# Patient Record
Sex: FEMALE | Race: WHITE | NOT HISPANIC OR LATINO
[De-identification: names, ages, dates, MRNs, and addresses within clinical notes are randomized per-mention and may not be internally consistent; named-entity substitution may affect disease eponyms.]

---

## 2017-02-07 ENCOUNTER — APPOINTMENT (OUTPATIENT)
Dept: VASCULAR SURGERY | Facility: CLINIC | Age: 82
End: 2017-02-07

## 2017-02-07 VITALS
RESPIRATION RATE: 16 BRPM | SYSTOLIC BLOOD PRESSURE: 145 MMHG | DIASTOLIC BLOOD PRESSURE: 86 MMHG | OXYGEN SATURATION: 97 % | HEART RATE: 85 BPM | BODY MASS INDEX: 27.82 KG/M2 | WEIGHT: 157 LBS | TEMPERATURE: 98 F | HEIGHT: 63 IN

## 2017-02-07 RX ORDER — ATORVASTATIN CALCIUM 80 MG/1
80 TABLET, FILM COATED ORAL
Qty: 90 | Refills: 0 | Status: ACTIVE | COMMUNITY
Start: 2016-07-15

## 2017-03-20 ENCOUNTER — INPATIENT (INPATIENT)
Facility: HOSPITAL | Age: 82
LOS: 2 days | Discharge: HOME HEALTH | DRG: 66 | End: 2017-03-23
Attending: HOSPITALIST | Admitting: INTERNAL MEDICINE
Payer: MEDICARE

## 2017-03-20 VITALS
WEIGHT: 157.85 LBS | RESPIRATION RATE: 20 BRPM | OXYGEN SATURATION: 97 % | HEART RATE: 91 BPM | DIASTOLIC BLOOD PRESSURE: 86 MMHG | TEMPERATURE: 98 F | SYSTOLIC BLOOD PRESSURE: 159 MMHG | HEIGHT: 63 IN

## 2017-03-20 DIAGNOSIS — Z98.89 OTHER SPECIFIED POSTPROCEDURAL STATES: Chronic | ICD-10-CM

## 2017-03-20 DIAGNOSIS — I10 ESSENTIAL (PRIMARY) HYPERTENSION: ICD-10-CM

## 2017-03-20 DIAGNOSIS — E11.49 TYPE 2 DIABETES MELLITUS WITH OTHER DIABETIC NEUROLOGICAL COMPLICATION: ICD-10-CM

## 2017-03-20 DIAGNOSIS — I48.91 UNSPECIFIED ATRIAL FIBRILLATION: ICD-10-CM

## 2017-03-20 DIAGNOSIS — I63.9 CEREBRAL INFARCTION, UNSPECIFIED: ICD-10-CM

## 2017-03-20 DIAGNOSIS — Z98.49 CATARACT EXTRACTION STATUS, UNSPECIFIED EYE: Chronic | ICD-10-CM

## 2017-03-20 DIAGNOSIS — E78.00 PURE HYPERCHOLESTEROLEMIA, UNSPECIFIED: ICD-10-CM

## 2017-03-20 LAB
ALBUMIN SERPL ELPH-MCNC: 4 G/DL — SIGNIFICANT CHANGE UP (ref 3.3–5.2)
ALP SERPL-CCNC: 66 U/L — SIGNIFICANT CHANGE UP (ref 40–120)
ALT FLD-CCNC: 15 U/L — SIGNIFICANT CHANGE UP
ANION GAP SERPL CALC-SCNC: 12 MMOL/L — SIGNIFICANT CHANGE UP (ref 5–17)
AST SERPL-CCNC: 28 U/L — SIGNIFICANT CHANGE UP
BASOPHILS # BLD AUTO: 0 K/UL — SIGNIFICANT CHANGE UP (ref 0–0.2)
BASOPHILS NFR BLD AUTO: 0.3 % — SIGNIFICANT CHANGE UP (ref 0–2)
BILIRUB SERPL-MCNC: 0.5 MG/DL — SIGNIFICANT CHANGE UP (ref 0.4–2)
BUN SERPL-MCNC: 19 MG/DL — SIGNIFICANT CHANGE UP (ref 8–20)
CALCIUM SERPL-MCNC: 9.2 MG/DL — SIGNIFICANT CHANGE UP (ref 8.6–10.2)
CHLORIDE SERPL-SCNC: 101 MMOL/L — SIGNIFICANT CHANGE UP (ref 98–107)
CHOLEST SERPL-MCNC: 178 MG/DL — SIGNIFICANT CHANGE UP (ref 110–199)
CO2 SERPL-SCNC: 29 MMOL/L — SIGNIFICANT CHANGE UP (ref 22–29)
CREAT SERPL-MCNC: 0.73 MG/DL — SIGNIFICANT CHANGE UP (ref 0.5–1.3)
EOSINOPHIL # BLD AUTO: 0.3 K/UL — SIGNIFICANT CHANGE UP (ref 0–0.5)
EOSINOPHIL NFR BLD AUTO: 4 % — SIGNIFICANT CHANGE UP (ref 0–6)
GLUCOSE SERPL-MCNC: 95 MG/DL — SIGNIFICANT CHANGE UP (ref 70–115)
HCT VFR BLD CALC: 39.3 % — SIGNIFICANT CHANGE UP (ref 37–47)
HDLC SERPL-MCNC: 55 MG/DL — LOW
HGB BLD-MCNC: 12.7 G/DL — SIGNIFICANT CHANGE UP (ref 12–16)
LIPID PNL WITH DIRECT LDL SERPL: 96 MG/DL — SIGNIFICANT CHANGE UP
LYMPHOCYTES # BLD AUTO: 1.9 K/UL — SIGNIFICANT CHANGE UP (ref 1–4.8)
LYMPHOCYTES # BLD AUTO: 26.4 % — SIGNIFICANT CHANGE UP (ref 20–55)
MCHC RBC-ENTMCNC: 28.5 PG — SIGNIFICANT CHANGE UP (ref 27–31)
MCHC RBC-ENTMCNC: 32.3 G/DL — SIGNIFICANT CHANGE UP (ref 32–36)
MCV RBC AUTO: 88.1 FL — SIGNIFICANT CHANGE UP (ref 81–99)
MONOCYTES # BLD AUTO: 0.8 K/UL — SIGNIFICANT CHANGE UP (ref 0–0.8)
MONOCYTES NFR BLD AUTO: 11.4 % — HIGH (ref 3–10)
NEUTROPHILS # BLD AUTO: 4.1 K/UL — SIGNIFICANT CHANGE UP (ref 1.8–8)
NEUTROPHILS NFR BLD AUTO: 57.6 % — SIGNIFICANT CHANGE UP (ref 37–73)
PLATELET # BLD AUTO: 179 K/UL — SIGNIFICANT CHANGE UP (ref 150–400)
POTASSIUM SERPL-MCNC: 4.6 MMOL/L — SIGNIFICANT CHANGE UP (ref 3.5–5.3)
POTASSIUM SERPL-SCNC: 4.6 MMOL/L — SIGNIFICANT CHANGE UP (ref 3.5–5.3)
PROT SERPL-MCNC: 7.3 G/DL — SIGNIFICANT CHANGE UP (ref 6.6–8.7)
RBC # BLD: 4.46 M/UL — SIGNIFICANT CHANGE UP (ref 4.4–5.2)
RBC # FLD: 13.6 % — SIGNIFICANT CHANGE UP (ref 11–15.6)
SODIUM SERPL-SCNC: 142 MMOL/L — SIGNIFICANT CHANGE UP (ref 135–145)
TOTAL CHOLESTEROL/HDL RATIO MEASUREMENT: 3 RATIO — LOW (ref 3.3–7.1)
TRIGL SERPL-MCNC: 136 MG/DL — SIGNIFICANT CHANGE UP (ref 10–200)
TROPONIN T SERPL-MCNC: <0.01 NG/ML — SIGNIFICANT CHANGE UP (ref 0–0.06)
WBC # BLD: 7.1 K/UL — SIGNIFICANT CHANGE UP (ref 4.8–10.8)
WBC # FLD AUTO: 7.1 K/UL — SIGNIFICANT CHANGE UP (ref 4.8–10.8)

## 2017-03-20 PROCEDURE — 93880 EXTRACRANIAL BILAT STUDY: CPT | Mod: 26

## 2017-03-20 PROCEDURE — 99223 1ST HOSP IP/OBS HIGH 75: CPT

## 2017-03-20 PROCEDURE — 93010 ELECTROCARDIOGRAM REPORT: CPT

## 2017-03-20 PROCEDURE — 70450 CT HEAD/BRAIN W/O DYE: CPT | Mod: 26

## 2017-03-20 PROCEDURE — 99291 CRITICAL CARE FIRST HOUR: CPT

## 2017-03-20 PROCEDURE — 71010: CPT | Mod: 26

## 2017-03-20 PROCEDURE — 70544 MR ANGIOGRAPHY HEAD W/O DYE: CPT | Mod: 26

## 2017-03-20 RX ORDER — ATORVASTATIN CALCIUM 80 MG/1
40 TABLET, FILM COATED ORAL AT BEDTIME
Qty: 0 | Refills: 0 | Status: DISCONTINUED | OUTPATIENT
Start: 2017-03-20 | End: 2017-03-23

## 2017-03-20 RX ORDER — DEXTROSE 50 % IN WATER 50 %
1 SYRINGE (ML) INTRAVENOUS ONCE
Qty: 0 | Refills: 0 | Status: DISCONTINUED | OUTPATIENT
Start: 2017-03-20 | End: 2017-03-23

## 2017-03-20 RX ORDER — DEXTROSE 50 % IN WATER 50 %
25 SYRINGE (ML) INTRAVENOUS ONCE
Qty: 0 | Refills: 0 | Status: DISCONTINUED | OUTPATIENT
Start: 2017-03-20 | End: 2017-03-23

## 2017-03-20 RX ORDER — ATORVASTATIN CALCIUM 80 MG/1
20 TABLET, FILM COATED ORAL AT BEDTIME
Qty: 0 | Refills: 0 | Status: DISCONTINUED | OUTPATIENT
Start: 2017-03-20 | End: 2017-03-20

## 2017-03-20 RX ORDER — OXYBUTYNIN CHLORIDE 5 MG
5 TABLET ORAL DAILY
Qty: 0 | Refills: 0 | Status: DISCONTINUED | OUTPATIENT
Start: 2017-03-20 | End: 2017-03-23

## 2017-03-20 RX ORDER — ASPIRIN/CALCIUM CARB/MAGNESIUM 324 MG
325 TABLET ORAL DAILY
Qty: 0 | Refills: 0 | Status: DISCONTINUED | OUTPATIENT
Start: 2017-03-20 | End: 2017-03-22

## 2017-03-20 RX ORDER — ASPIRIN/CALCIUM CARB/MAGNESIUM 324 MG
325 TABLET ORAL ONCE
Qty: 0 | Refills: 0 | Status: COMPLETED | OUTPATIENT
Start: 2017-03-20 | End: 2017-03-20

## 2017-03-20 RX ORDER — MULTIVIT-MIN/FERROUS GLUCONATE 9 MG/15 ML
0 LIQUID (ML) ORAL
Qty: 0 | Refills: 0 | COMMUNITY

## 2017-03-20 RX ORDER — ENOXAPARIN SODIUM 100 MG/ML
40 INJECTION SUBCUTANEOUS EVERY 24 HOURS
Qty: 0 | Refills: 0 | Status: DISCONTINUED | OUTPATIENT
Start: 2017-03-20 | End: 2017-03-22

## 2017-03-20 RX ORDER — INSULIN LISPRO 100/ML
VIAL (ML) SUBCUTANEOUS
Qty: 0 | Refills: 0 | Status: DISCONTINUED | OUTPATIENT
Start: 2017-03-20 | End: 2017-03-23

## 2017-03-20 RX ORDER — DEXTROSE 50 % IN WATER 50 %
12.5 SYRINGE (ML) INTRAVENOUS ONCE
Qty: 0 | Refills: 0 | Status: DISCONTINUED | OUTPATIENT
Start: 2017-03-20 | End: 2017-03-23

## 2017-03-20 RX ORDER — GLUCAGON INJECTION, SOLUTION 0.5 MG/.1ML
1 INJECTION, SOLUTION SUBCUTANEOUS ONCE
Qty: 0 | Refills: 0 | Status: DISCONTINUED | OUTPATIENT
Start: 2017-03-20 | End: 2017-03-23

## 2017-03-20 RX ORDER — SODIUM CHLORIDE 9 MG/ML
1000 INJECTION, SOLUTION INTRAVENOUS
Qty: 0 | Refills: 0 | Status: DISCONTINUED | OUTPATIENT
Start: 2017-03-20 | End: 2017-03-23

## 2017-03-20 RX ADMIN — Medication 325 MILLIGRAM(S): at 12:01

## 2017-03-20 NOTE — ED PROVIDER NOTE - PMH
Abdominal aortic aneurysm    Atrial fibrillation    Diabetes    High cholesterol    Hypertension    Overactive bladder

## 2017-03-20 NOTE — ED PROVIDER NOTE - CRITICAL CARE PROVIDED
additional history taking/direct patient care (not related to procedure)/documentation/interpretation of diagnostic studies/consultation with other physicians

## 2017-03-20 NOTE — H&P ADULT - NSHPLABSRESULTS_GEN_ALL_CORE
CT brain: IMPRESSION: No acute segmental infarct or hemorrhage. Extensive small   vessel ischemic changes which may mask acute ischemia,  MRI and/or   follow-up CT is a more sensitive modality and may be considered if   clinically indicated. Findings were discussed with Physician: FRANC CARVALHO [phone] with a read back at 11:40 AM.     CXR ordered    EKG visualized Sinus bradycardia, nonspecific ST abnormalities

## 2017-03-20 NOTE — H&P ADULT - HISTORY OF PRESENT ILLNESS
83 y F hx PAF, DM2, HTN, HLD, presented to ER c/o awoke this AM with slurred speech,  L hand numbness. Denies CP,SOB, palpitations, F/C, h/a, N/V/D, dysuria, focal weakness. Mild blurry vision. Denies any prior episodes. ER discussed w stroke neuro, deemed not a candidate for TPA.

## 2017-03-20 NOTE — ED PROVIDER NOTE - NEUROLOGICAL, MLM
Alert and oriented, no focal deficits, no motor or sensory deficits, minimal slurred speech, normal gait, no facial droop, no drift

## 2017-03-20 NOTE — H&P ADULT - ASSESSMENT
83 y F hx HTN, HLD, DM2, presented to ER c/o slurred speech, L hand numbness since this AM, r/o CVA.     CVA: CT w extensive small vessel ischemic changes, no acute infarct or hemorrhage seen, f/u MRI/MRA, CD, echo. Cardio eval for hx of afib, currently in NSR. appreciate neuro input. cont asa, statin.   HTN: hold meds for now, resume as tolerated  DM: monitor FS, sliding scale    Prophyl: lovenox

## 2017-03-20 NOTE — ED ADULT NURSE NOTE - CAS EDN DISCHARGE ASSESSMENT
Alert and oriented to person, place and time/Awake Alert and oriented to person, place and time/Awake/Patient baseline mental status

## 2017-03-20 NOTE — ED ADULT TRIAGE NOTE - CHIEF COMPLAINT QUOTE
Patient arrived to ED today with c/o slurred speech, left sided weakness, right sided facial droop since 7am today.  Patient states she feels like she is floating.

## 2017-03-20 NOTE — CONSULT NOTE ADULT - ASSESSMENT
1. signs and sx's favor acute CVA-possibly embolic given hx of rare Parox A-fib. Would consider systemic AC with heparin and followed by a NOAC if OK'd by neuro. Will need an MRI of head.  2. hx of hpt-normotensive presently  3. aodm. 1. signs and sx's favor acute CVA-possibly embolic given hx of rare Parox A-fib. Would consider systemic AC with heparin and followed by a NOAC if OK'd by neuro. Will need an MRI of head. Consider DEYANIRA to exclude CSE.  2. hx of hpt-normotensive presently  3. aodm.

## 2017-03-20 NOTE — CONSULT NOTE ADULT - SUBJECTIVE AND OBJECTIVE BOX
CHIEF COMPLAINT: slurred sppech    HPI: 82yFemale with h/o DM, HLD, HTN  presents with slurred speech upon awakening this am. Went to bed asymptomatic. No previous h/o CVA/TIA.  Otherwise feels well. Perhaps mild blurred vision and dizziness.  She has h/o afib about one year ago followed by Dr. Carroll.   not a tpa candidate. On asa 81mg.      PAST MEDICAL & SURGICAL HISTORY:  Abdominal aortic aneurysm  Atrial fibrillation  Overactive bladder  High cholesterol  Diabetes  Hypertension  H/O foot surgery  H/O cataract extraction    MEDICATIONS  (STANDING): list pending    MEDICATIONS  (PRN):     Allergies     No Known Allergies    Intolerances        FAMILY HISTORY:  No pertinent family history in first degree relatives          SOCIAL HISTORY:    Tobacco:  no  Alcohol:  no  Drugs:  no        REVIEW OF SYSTEMS:    Relevant systems are negative except as noted in the chart, HPI, and PMH      VITAL SIGNS:  Vital Signs Last 24 Hrs  T(C): 36.4, Max: 36.4 (03-20 @ 10:57)  T(F): 97.6, Max: 97.6 (03-20 @ 10:57)  HR: 82 (82 - 91)  BP: 175/82 (159/86 - 175/82)  BP(mean): --  RR: 18 (18 - 20)  SpO2: 97% (97% - 97%)    PHYSICAL EXAMINATION:    General: Well-developed, well nourished, in no acute distress.  Cardiac:  Regular rate and rhythm. No carotid bruits appreciated.  Eyes: Fundoscopic examination was deferred.  Neurologic:  - Mental Status:  Alert, awake, oriented to person, place, and time; Speech is fluent with intact naming, repetition, and comprehension  Cranial Nerves II-XII:    II:  Visual acuity is normal for age ; Visual fields are full to confrontation; Pupils are equal, round, and reactive to light.  III, IV, VI:  Extraocular movements are intact without nystagmus.  V:  Facial sensation is intact in the V1-V3 distribution bilaterally.  VII:  Face - slight flatteing of left NLF with normal eye closure and smile  VIII:  Hearing is intact and symmetric for age  IX, X:  Uvula is midline and soft palate rises symmetrically  XI:  Head turning and shoulder shrug are intact.  XII:  Tongue protrudes in the midline. mild dysarthira/slurr  - Motor:  Strength is 5/5 throughout.  There is no pronator drift.  Normal muscle bulk and tone throughout.  - Reflexes:  1+ and symmetric at the biceps, and  knees.  Plantar responses flexor.  - Sensory:  Intact and symmetric to light touch, and joint-position sense.  - Coordination:  Finger-nose-finger is normal. Rapid alternating hand movements are intact.       LABS:  pend    RADIOLOGY & ADDITIONAL STUDIES:     CT: No acute segmental infarct or hemorrhage. Extensive small   vessel ischemic changes which may mask acute ischemia,      IMPRESSION:  82 year old woman presents with onset of mild dysarthria and subtle left facial droop this am.  She has multiple vascualr risk factors and her CT shows extensive MVD despite no clinical h/o stroke or TIA  She has h/o afib in past. Currently only on ASA    PLAN:  1. Admit to Stroke unite- Dr. Mcdaniel- stroke protocols  2. Medical and cardiac assessment- management of vascular risk factors  3. Continue antiplatelet therapy for now unless cardioembolic potential/source is identified  4.  MRI/ MRA head and neck  5. Carotid duplex

## 2017-03-20 NOTE — ED PROVIDER NOTE - MEDICAL DECISION MAKING DETAILS
The patient woke up with slurred speech and off balance feeling at 7 AM. Last normal at 10 PM last night. NIH 1, The case d/w Wyatt Telestroke MD and recommend NO TPA at this time and also, Dr Peñaloza our Neurology MD seen patient and recommend to admit to Stroke Unit

## 2017-03-20 NOTE — ED PROVIDER NOTE - OBJECTIVE STATEMENT
The patient is a 82 year old female with history of DM, HTN, HyperCholesterol presents with feeling off balance and slurred speech upon waking up at 7 am.  The patient went to bed at 10 PM last night feeling normal.  But the patient woke up with these symptoms.  No HA, No CP, No SOB, No ABD Pain

## 2017-03-20 NOTE — H&P ADULT - NSHPPHYSICALEXAM_GEN_ALL_CORE
Vital Signs Last 24 Hrs  T(C): 36.4, Max: 36.4 (03-20 @ 10:57)  T(F): 97.6, Max: 97.6 (03-20 @ 10:57)  HR: 82 (82 - 91)  BP: 175/82 (159/86 - 175/82)  BP(mean): --  RR: 18 (18 - 20)  SpO2: 97% (97% - 97%)    PHYSICAL EXAM-  GENERAL: alert, NAD  HEAD:  Atraumatic, Normocephalic  EYES: EOMI,  conjunctiva and sclera clear  NECK: Supple   CHEST/LUNG: CTA bilaterally   HEART: Regular rate and rhythm; No murmurs   ABDOMEN: Soft, Nontender, Nondistended; Bowel sounds present  EXTREMITIES:  2+ Peripheral Pulses, No clubbing, cyanosis, or edema  NERVOUS SYSTEM:  Alert & Oriented X3, Motor Strength 5/5 B/L upper and lower extremities; mild slurred speech, sensation intact  SKIN: No rashes or lesions

## 2017-03-20 NOTE — CONSULT NOTE ADULT - SUBJECTIVE AND OBJECTIVE BOX
Chief Complaint: slurred speech and blurry vision/left hand weakness    HPI: Old records reviewed. 83 yo F brought to ER after awakening with new slurred speech (pt aware) with blurry vision and left had weakness. [place] well last night. Seen by neuro-no tpa. PMH + for aodm/hypertension and rare parox a-fib. No prior neuro hx/mi/cp/murmur/chf/renal/hepatic/heme hx. Hx of goiter but no thyroid rx. No significant surgical hx. Non smoker/etoh/no allergy. ROS otherwise neg. OP meds of asa/lipitor/metformin/lopressor and oxybutinin.    PAST MEDICAL & SURGICAL HISTORY:  Abdominal aortic aneurysm  Atrial fibrillation  Overactive bladder  High cholesterol  Diabetes  Hypertension  H/O foot surgery  H/O cataract extraction      PREVIOUS DIAGNOSTIC TESTING:      ECHO  FINDINGS: echo today-LVEF 50+%/mild AI/mild to moderate MR/mildly  dilated dilated LA.    STRESS  FINDINGS: coronary pet: calcified coronaries/EF 50% no ischemia.      CATHETERIZATION  FINDINGS:    MEDICATIONS  (STANDING):  aspirin 325milliGRAM(s) Oral daily  enoxaparin Injectable 40milliGRAM(s) SubCutaneous every 24 hours  atorvastatin 40milliGRAM(s) Oral at bedtime  oxybutynin 5milliGRAM(s) Oral daily  insulin lispro (HumaLOG) corrective regimen sliding scale  SubCutaneous three times a day before meals  dextrose 5%. 1000milliLiter(s) IV Continuous <Continuous>  dextrose 50% Injectable 12.5Gram(s) IV Push once  dextrose 50% Injectable 25Gram(s) IV Push once  dextrose 50% Injectable 25Gram(s) IV Push once    MEDICATIONS  (PRN):  dextrose Gel 1Dose(s) Oral once PRN Blood Glucose LESS THAN 70 milliGRAM(s)/deciliter  glucagon  Injectable 1milliGRAM(s) IntraMuscular once PRN Glucose LESS THAN 70 milligrams/deciliter      FAMILY HISTORY:  No pertinent family history in first degree relatives      SOCIAL HISTORY:    CIGARETTES: 0    ALCOHOL: 0    ROS: Negative other than as mentioned in HPI.    Vital Signs Last 24 Hrs  T(C): 36.4, Max: 36.4 (03-20 @ 10:57)  T(F): 97.6, Max: 97.6 (03-20 @ 10:57)  HR: 61 (61 - 91)  BP: 151/65 (151/65 - 175/82)  BP(mean): --  RR: 20 (18 - 24)  SpO2: 98% (97% - 98%)    PHYSICAL EXAM:  General: Appears well developed, well nourished alert and cooperative. speech is slurred  HEENT: Head; normocephalic, atraumatic.  Eyes;   Pupils reactive, cornea wnl.  Neck; Supple, no nodes adenopathy, no NVD or carotid bruit or thyromegaly.  CARDIOVASCULAR; Soft mild diastolic murmur of AI. murmur, rub, gallop or lift. Normal S1 and S2.   LUNGS; No rales, rhonchi or wheeze. Normal breath sounds bilaterally.  ABDOMEN ; Soft, nontender without mass or organomegaly. bowel sounds normoactive.  EXTREMITIES; No clubbing, cyanosis or edema. Distal pulses wnl. ROM normal.   SKIN; warm and dry with normal turgor.  NEURO; Alert/oriented x 3/normal motor exam. No pathologic reflexes. Mildly slurred speech and very mild left sided mouth droop  PSYCH; normal affect.            INTERPRETATION OF TELEMETRY:    ECG: SR with apc's otherwise nl.  cxr normal lungs and cor with a right parasternal soft  tissue density.  I&O's Detail  CT of head -diffuse MVD w/o clear cut cva.  sono of carotids pending.  LABS:                        12.7   7.1   )-----------( 179      ( 20 Mar 2017 11:27 )             39.3     20 Mar 2017 11:27    142    |  101    |  19.0   ----------------------------<  95     4.6     |  29.0   |  0.73     Ca    9.2        20 Mar 2017 11:27    TPro  7.3    /  Alb  4.0    /  TBili  0.5    /  DBili  x      /  AST  28     /  ALT  15     /  AlkPhos  66     20 Mar 2017 11:27    CARDIAC MARKERS ( 20 Mar 2017 11:27 )  x     / <0.01 ng/mL / x     / x     / x              I&O's Summary      RADIOLOGY & ADDITIONAL STUDIES:

## 2017-03-20 NOTE — ED PROVIDER NOTE - CHPI ED SYMPTOMS NEG
no weakness/no fever/no change in level of consciousness/no dizziness/no numbness/no confusion/no nausea/no blurred vision/no vomiting/no loss of consciousness

## 2017-03-20 NOTE — ED PROVIDER NOTE - CARE PLAN
Principal Discharge DX:	Stroke  Secondary Diagnosis:	Atrial fibrillation  Secondary Diagnosis:	Diabetes

## 2017-03-20 NOTE — ED ADULT NURSE NOTE - OBJECTIVE STATEMENT
82 year old female comes to the ER. code STROKE activated. as per patient she went to sleep at 10pm and woke up this am at 7am. pt. states she felt off balanced , slight slurred speech. presenting to the ED. MD. Leach at bedside. no deficits noted. bs 89- pt. denies chest pain, or sob. vs as charted, iv placed, labs sent. will continue to monitor.

## 2017-03-21 PROCEDURE — 71250 CT THORAX DX C-: CPT | Mod: 26

## 2017-03-21 PROCEDURE — 99233 SBSQ HOSP IP/OBS HIGH 50: CPT

## 2017-03-21 PROCEDURE — 93010 ELECTROCARDIOGRAM REPORT: CPT

## 2017-03-21 RX ORDER — METOPROLOL TARTRATE 50 MG
5 TABLET ORAL ONCE
Qty: 0 | Refills: 0 | Status: COMPLETED | OUTPATIENT
Start: 2017-03-21 | End: 2017-03-21

## 2017-03-21 RX ORDER — ACETAMINOPHEN 500 MG
650 TABLET ORAL EVERY 6 HOURS
Qty: 0 | Refills: 0 | Status: DISCONTINUED | OUTPATIENT
Start: 2017-03-21 | End: 2017-03-23

## 2017-03-21 RX ORDER — METOPROLOL TARTRATE 50 MG
50 TABLET ORAL
Qty: 0 | Refills: 0 | Status: DISCONTINUED | OUTPATIENT
Start: 2017-03-21 | End: 2017-03-22

## 2017-03-21 RX ADMIN — ATORVASTATIN CALCIUM 40 MILLIGRAM(S): 80 TABLET, FILM COATED ORAL at 00:14

## 2017-03-21 RX ADMIN — Medication 5 MILLIGRAM(S): at 20:15

## 2017-03-21 RX ADMIN — ENOXAPARIN SODIUM 40 MILLIGRAM(S): 100 INJECTION SUBCUTANEOUS at 00:16

## 2017-03-21 RX ADMIN — Medication 50 MILLIGRAM(S): at 21:04

## 2017-03-21 RX ADMIN — Medication: at 17:37

## 2017-03-21 RX ADMIN — Medication 5 MILLIGRAM(S): at 11:41

## 2017-03-21 RX ADMIN — Medication 325 MILLIGRAM(S): at 11:41

## 2017-03-21 NOTE — PROGRESS NOTE ADULT - ASSESSMENT
83 y F hx HTN, HLD, DM2, h/o PAF presented to ER c/o slurred speech, L hand numbness admitted due to CVA, CT head showed extensive small vessel ischemic changes    A/P    > CVA: Appreciate Neurology input   CT w extensive small vessel ischemic changes, no acute infarct or hemorrhage seen,   MRI/MRA and echo.pending  carotid doppler - negative , LDL 96  appreciate cardiology input, schedule for DEYANIRA in am , defer Ac as per cardiology   cont asa, statin.     > HTN: Permissive HTN due to possible stroke  labetalol prn of SBP is >180    > DM: Stable  monitor FS, sliding scale  A1c     > Prophyl: lovenox

## 2017-03-21 NOTE — PROGRESS NOTE ADULT - SUBJECTIVE AND OBJECTIVE BOX
Internal Medicine Hospitalist - Dr. William MOSS    [id number]    82y      Female    Patient is a 82y old  Female who presents with a chief complaint of slurred speech (20 Mar 2017 12:10)    HPI:  83 y F hx PAF, DM2, HTN, HLD, presented to ER c/o awoke this AM with slurred speech,  L hand numbness. Denies CP,SOB, palpitations, F/C, h/a, N/V/D, dysuria, focal weakness. Mild blurry vision. Denies any prior episodes. ER discussed w stroke neuro, deemed not a candidate for TPA. (20 Mar 2017 12:10)      INTERVAL HPI/ OVERNIGHT EVENTS: Patient is seen and examined, pt cont. to report very mild slurred speech, denied weakness, numbness, chest pain, SOB    REVIEW OF SYSTEMS:    Denied fever, chills, abd. pain, nausea, vomiting, chest pain, SOB, headache, dizziness    PHYSICAL EXAM:    Vital Signs Last 24 Hrs  T(C): 36.7, Max: 36.7 (03-21 @ 10:44)  T(F): 98.1, Max: 98.1 (03-21 @ 10:44)  HR: 81 (62 - 81)  BP: 162/72 (147/74 - 181/74)  BP(mean): --  RR: 18 (16 - 22)  SpO2: 95% (94% - 98%)    GENERAL: NAD  HEENT: EOMI  Neck: supple  CHEST/LUNG: Clear to percussion bilaterally; No wheezing  HEART: S1S2+ audible  ABDOMEN: Soft, Nontender, Nondistended; Bowel sounds present  EXTREMITIES:  no edema  CNS: AAO X 3, mild dysarthria, power equal in all 4 extremities, normal sensation    Psychiatry: normal mood    LABS:                        12.7   7.1   )-----------( 179      ( 20 Mar 2017 11:27 )             39.3     20 Mar 2017 11:27    142    |  101    |  19.0   ----------------------------<  95     4.6     |  29.0   |  0.73     Ca    9.2        20 Mar 2017 11:27    TPro  7.3    /  Alb  4.0    /  TBili  0.5    /  DBili  x      /  AST  28     /  ALT  15     /  AlkPhos  66     20 Mar 2017 11:27      MEDICATIONS  (STANDING):  aspirin 325milliGRAM(s) Oral daily  enoxaparin Injectable 40milliGRAM(s) SubCutaneous every 24 hours  atorvastatin 40milliGRAM(s) Oral at bedtime  oxybutynin 5milliGRAM(s) Oral daily  insulin lispro (HumaLOG) corrective regimen sliding scale  SubCutaneous three times a day before meals  dextrose 5%. 1000milliLiter(s) IV Continuous <Continuous>  dextrose 50% Injectable 12.5Gram(s) IV Push once  dextrose 50% Injectable 25Gram(s) IV Push once  dextrose 50% Injectable 25Gram(s) IV Push once    MEDICATIONS  (PRN):  dextrose Gel 1Dose(s) Oral once PRN Blood Glucose LESS THAN 70 milliGRAM(s)/deciliter  glucagon  Injectable 1milliGRAM(s) IntraMuscular once PRN Glucose LESS THAN 70 milligrams/deciliter  acetaminophen   Tablet. 650milliGRAM(s) Oral every 6 hours PRN Moderate Pain (4 - 6)      RADIOLOGY & ADDITIONAL TEST   EXAM:  US DPLX CAROTIDS COMPL BI                        PROCEDURE DATE:  03/20/2017    Impression:   No hemodynamically significant stenosis in the carotid or vertebral   arteries according to NASCET criteria.

## 2017-03-21 NOTE — PROGRESS NOTE ADULT - ASSESSMENT
1. elderly F with very recent CVA by findings. MRI pending.  2. with hx of paroxysmal afib will schedule for DEYANIRA to r/o VALERIE clot and assess need for AC.-proceedure explained in detail to pt and family.  3. chronic AI.  4. hx of dm.

## 2017-03-21 NOTE — PROGRESS NOTE ADULT - SUBJECTIVE AND OBJECTIVE BOX
Chief Complaint: recent course reviewed.    HPI: Remains with slurred speech (?improved) and mild weakness of left hand. Pt unhappy about being in ER this long.    PAST MEDICAL & SURGICAL HISTORY:  Abdominal aortic aneurysm  Atrial fibrillation  Overactive bladder  High cholesterol  Diabetes  Hypertension  H/O foot surgery  H/O cataract extraction      PREVIOUS DIAGNOSTIC TESTING:      ECHO  FINDINGS: Moderate AI and LVEF 50+%.    STRESS  FINDINGS:    CATHETERIZATION  FINDINGS:    MEDICATIONS  (STANDING):  aspirin 325milliGRAM(s) Oral daily  enoxaparin Injectable 40milliGRAM(s) SubCutaneous every 24 hours  atorvastatin 40milliGRAM(s) Oral at bedtime  oxybutynin 5milliGRAM(s) Oral daily  insulin lispro (HumaLOG) corrective regimen sliding scale  SubCutaneous three times a day before meals  dextrose 5%. 1000milliLiter(s) IV Continuous <Continuous>  dextrose 50% Injectable 12.5Gram(s) IV Push once  dextrose 50% Injectable 25Gram(s) IV Push once  dextrose 50% Injectable 25Gram(s) IV Push once    MEDICATIONS  (PRN):  dextrose Gel 1Dose(s) Oral once PRN Blood Glucose LESS THAN 70 milliGRAM(s)/deciliter  glucagon  Injectable 1milliGRAM(s) IntraMuscular once PRN Glucose LESS THAN 70 milligrams/deciliter      FAMILY HISTORY:  No pertinent family history in first degree relatives      ROS: Negative other than as mentioned in HPI.    Vital Signs Last 24 Hrs  T(C): 36.5, Max: 36.6 (03-20 @ 18:47)  T(F): 97.7, Max: 97.8 (03-20 @ 18:47)  HR: 80 (61 - 91)  BP: 120/80 RA manually (147/74 - 181/74)  BP(mean): --  RR: 12 non-labored. (16 - 24)  SpO2: 98% (94% - 98%)    PHYSICAL EXAM:  General: Appears well developed, well nourished alert and cooperative.  HEENT: Head; normocephalic, atraumatic.  Eyes;   Pupils reactive, cornea wnl.  Neck; Supple, no nodes adenopathy, no NVD or carotid bruit or thyromegaly.  CARDIOVASCULAR; No murmur, rub, gallop or lift. Normal S1 and S2.  LUNGS; No rales, rhonchi or wheeze. Normal breath sounds bilaterally.  ABDOMEN ; Soft, nontender without mass or organomegaly. bowel sounds normoactive.  EXTREMITIES; No clubbing, cyanosis or edema. Distal pulses wnl. ROM normal.  SKIN; warm and dry with normal turgor.  NEURO; Alert/oriented x 3/normal motor exam. No pathologic reflexes. Slurred speech with left mouth droop.  PSYCH; normal affect.            INTERPRETATION OF TELEMETRY:    ECG: monitor of sr with [facility]'s  MRI pending  I&O's Detail      LABS:                        12.7   7.1   )-----------( 179      ( 20 Mar 2017 11:27 )             39.3     20 Mar 2017 11:27    142    |  101    |  19.0   ----------------------------<  95     4.6     |  29.0   |  0.73     Ca    9.2        20 Mar 2017 11:27    TPro  7.3    /  Alb  4.0    /  TBili  0.5    /  DBili  x      /  AST  28     /  ALT  15     /  AlkPhos  66     20 Mar 2017 11:27    CARDIAC MARKERS ( 20 Mar 2017 11:27 )  x     / <0.01 ng/mL / x     / x     / x              I&O's Summary      RADIOLOGY & ADDITIONAL STUDIES:

## 2017-03-21 NOTE — ED ADULT NURSE REASSESSMENT NOTE - GENERAL PATIENT STATE
comfortable appearance/family/SO at bedside
comfortable appearance/family/SO at bedside
family/SO at bedside/comfortable appearance
resting/sleeping/comfortable appearance
family/SO at bedside/comfortable appearance

## 2017-03-22 ENCOUNTER — TRANSCRIPTION ENCOUNTER (OUTPATIENT)
Age: 82
End: 2017-03-22

## 2017-03-22 LAB
ANION GAP SERPL CALC-SCNC: 12 MMOL/L — SIGNIFICANT CHANGE UP (ref 5–17)
BUN SERPL-MCNC: 19 MG/DL — SIGNIFICANT CHANGE UP (ref 8–20)
CALCIUM SERPL-MCNC: 8.7 MG/DL — SIGNIFICANT CHANGE UP (ref 8.6–10.2)
CHLORIDE SERPL-SCNC: 103 MMOL/L — SIGNIFICANT CHANGE UP (ref 98–107)
CO2 SERPL-SCNC: 26 MMOL/L — SIGNIFICANT CHANGE UP (ref 22–29)
CREAT SERPL-MCNC: 0.62 MG/DL — SIGNIFICANT CHANGE UP (ref 0.5–1.3)
GLUCOSE SERPL-MCNC: 104 MG/DL — SIGNIFICANT CHANGE UP (ref 70–115)
HBA1C BLD-MCNC: 5.8 % — HIGH (ref 4–5.6)
HCT VFR BLD CALC: 38.2 % — SIGNIFICANT CHANGE UP (ref 37–47)
HGB BLD-MCNC: 12.5 G/DL — SIGNIFICANT CHANGE UP (ref 12–16)
MAGNESIUM SERPL-MCNC: 2.1 MG/DL — SIGNIFICANT CHANGE UP (ref 1.8–2.5)
MCHC RBC-ENTMCNC: 28 PG — SIGNIFICANT CHANGE UP (ref 27–31)
MCHC RBC-ENTMCNC: 32.7 G/DL — SIGNIFICANT CHANGE UP (ref 32–36)
MCV RBC AUTO: 85.7 FL — SIGNIFICANT CHANGE UP (ref 81–99)
PHOSPHATE SERPL-MCNC: 3.2 MG/DL — SIGNIFICANT CHANGE UP (ref 2.4–4.7)
PLATELET # BLD AUTO: 173 K/UL — SIGNIFICANT CHANGE UP (ref 150–400)
POTASSIUM SERPL-MCNC: 4.2 MMOL/L — SIGNIFICANT CHANGE UP (ref 3.5–5.3)
POTASSIUM SERPL-SCNC: 4.2 MMOL/L — SIGNIFICANT CHANGE UP (ref 3.5–5.3)
RBC # BLD: 4.46 M/UL — SIGNIFICANT CHANGE UP (ref 4.4–5.2)
RBC # FLD: 13.3 % — SIGNIFICANT CHANGE UP (ref 11–15.6)
SODIUM SERPL-SCNC: 141 MMOL/L — SIGNIFICANT CHANGE UP (ref 135–145)
WBC # BLD: 7.1 K/UL — SIGNIFICANT CHANGE UP (ref 4.8–10.8)
WBC # FLD AUTO: 7.1 K/UL — SIGNIFICANT CHANGE UP (ref 4.8–10.8)

## 2017-03-22 PROCEDURE — 70551 MRI BRAIN STEM W/O DYE: CPT | Mod: 26

## 2017-03-22 PROCEDURE — 99233 SBSQ HOSP IP/OBS HIGH 50: CPT

## 2017-03-22 PROCEDURE — 70547 MR ANGIOGRAPHY NECK W/O DYE: CPT | Mod: 26

## 2017-03-22 RX ORDER — PANTOPRAZOLE SODIUM 20 MG/1
40 TABLET, DELAYED RELEASE ORAL
Qty: 0 | Refills: 0 | Status: DISCONTINUED | OUTPATIENT
Start: 2017-03-22 | End: 2017-03-23

## 2017-03-22 RX ORDER — APIXABAN 2.5 MG/1
5 TABLET, FILM COATED ORAL
Qty: 0 | Refills: 0 | Status: DISCONTINUED | OUTPATIENT
Start: 2017-03-22 | End: 2017-03-23

## 2017-03-22 RX ORDER — INFLUENZA VIRUS VACCINE 15; 15; 15; 15 UG/.5ML; UG/.5ML; UG/.5ML; UG/.5ML
0.5 SUSPENSION INTRAMUSCULAR ONCE
Qty: 0 | Refills: 0 | Status: COMPLETED | OUTPATIENT
Start: 2017-03-22 | End: 2017-03-23

## 2017-03-22 RX ORDER — APIXABAN 2.5 MG/1
1 TABLET, FILM COATED ORAL
Qty: 60 | Refills: 0 | OUTPATIENT
Start: 2017-03-22 | End: 2017-04-21

## 2017-03-22 RX ORDER — METOPROLOL TARTRATE 50 MG
50 TABLET ORAL THREE TIMES A DAY
Qty: 0 | Refills: 0 | Status: DISCONTINUED | OUTPATIENT
Start: 2017-03-22 | End: 2017-03-23

## 2017-03-22 RX ADMIN — ATORVASTATIN CALCIUM 40 MILLIGRAM(S): 80 TABLET, FILM COATED ORAL at 00:13

## 2017-03-22 RX ADMIN — APIXABAN 5 MILLIGRAM(S): 2.5 TABLET, FILM COATED ORAL at 19:05

## 2017-03-22 RX ADMIN — Medication 50 MILLIGRAM(S): at 05:43

## 2017-03-22 RX ADMIN — ENOXAPARIN SODIUM 40 MILLIGRAM(S): 100 INJECTION SUBCUTANEOUS at 00:14

## 2017-03-22 RX ADMIN — Medication 5 MILLIGRAM(S): at 13:57

## 2017-03-22 RX ADMIN — ATORVASTATIN CALCIUM 40 MILLIGRAM(S): 80 TABLET, FILM COATED ORAL at 21:36

## 2017-03-22 RX ADMIN — Medication 50 MILLIGRAM(S): at 15:57

## 2017-03-22 NOTE — DISCHARGE NOTE ADULT - CARE PLAN
Principal Discharge DX:	Cerebrovascular accident (CVA), unspecified mechanism  Goal:	rehab, prevent recurrence  Instructions for follow-up, activity and diet:	f/u with PMD in 1week  Secondary Diagnosis:	Atrial fibrillation, unspecified type  Instructions for follow-up, activity and diet:	f/u with cardiology in 1week  Secondary Diagnosis:	Diabetes  Secondary Diagnosis:	Essential hypertension  Secondary Diagnosis:	Dysarthria due to recent cerebrovascular accident  Instructions for follow-up, activity and diet:	speech therapy  Secondary Diagnosis:	High cholesterol

## 2017-03-22 NOTE — PROGRESS NOTE ADULT - SUBJECTIVE AND OBJECTIVE BOX
Internal Medicine Hospitalist - Dr. William MOSS    [id number]    82y      Female    Patient is a 82y old  Female who presents with a chief complaint of slurred speech (20 Mar 2017 12:10)    HPI:  83 y F hx PAF, DM2, HTN, HLD, presented to ER c/o awoke this AM with slurred speech,  L hand numbness. Denies CP,SOB, palpitations, F/C, h/a, N/V/D, dysuria, focal weakness. Mild blurry vision. Denies any prior episodes. ER discussed w stroke neuro, deemed not a candidate for TPA. (20 Mar 2017 12:10      INTERVAL HPI/ OVERNIGHT EVENTS: Patient is seen and examined, pt went to A.fib with RVR last night, report mild palpitation, rate controlled this morning, cont. to report mild dysarthria     REVIEW OF SYSTEMS:    Denied fever, chills, abd. pain, nausea, vomiting, chest pain, SOB, headache, dizziness    PHYSICAL EXAM:    Vital Signs Last 24 Hrs  T(C): 36.6, Max: 36.6 (03-22 @ 09:02)  T(F): 97.8, Max: 97.8 (03-22 @ 09:02)  HR: 68 (68 - 142)  BP: 157/72 (118/77 - 162/73)  BP(mean): --  RR: 19 (18 - 22)  SpO2: 98% (96% - 98%)    GENERAL: NAD  HEENT: EOMI  Neck: supple  CHEST/LUNG: Clear to percussion bilaterally; No wheezing  HEART: S1S2+ audible  ABDOMEN: Soft, Nontender, Nondistended; Bowel sounds present  EXTREMITIES:  no edema  CNS: AAO X 3, slight flatteing of left NLF, mild dysarthria,  power 5/5, sensation intact   Psychiatry: normal mood    LABS:                        12.5   7.1   )-----------( 173      ( 22 Mar 2017 06:34 )             38.2     22 Mar 2017 06:34    141    |  103    |  19.0   ----------------------------<  104    4.2     |  26.0   |  0.62     Ca    8.7        22 Mar 2017 06:34  Phos  3.2       22 Mar 2017 06:34  Mg     2.1       22 Mar 2017 06:34      MEDICATIONS  (STANDING):  atorvastatin 40milliGRAM(s) Oral at bedtime  oxybutynin 5milliGRAM(s) Oral daily  insulin lispro (HumaLOG) corrective regimen sliding scale  SubCutaneous three times a day before meals  dextrose 5%. 1000milliLiter(s) IV Continuous <Continuous>  dextrose 50% Injectable 12.5Gram(s) IV Push once  dextrose 50% Injectable 25Gram(s) IV Push once  dextrose 50% Injectable 25Gram(s) IV Push once  apixaban 5milliGRAM(s) Oral two times a day  metoprolol 50milliGRAM(s) Oral three times a day  pantoprazole    Tablet 40milliGRAM(s) Oral before breakfast    MEDICATIONS  (PRN):  dextrose Gel 1Dose(s) Oral once PRN Blood Glucose LESS THAN 70 milliGRAM(s)/deciliter  glucagon  Injectable 1milliGRAM(s) IntraMuscular once PRN Glucose LESS THAN 70 milligrams/deciliter  acetaminophen   Tablet. 650milliGRAM(s) Oral every 6 hours PRN Moderate Pain (4 - 6)      RADIOLOGY & ADDITIONAL TEST  EXAM:  ECHO TRANSTHORACIC COMP W DOPP    PROCEDURE DATE:  Mar 20 2017    Summary:   1. Left ventricular ejection fraction, by visual estimation, is 50 to   55%.   2. Normal global left ventricular systolic function.   3. Increased LV wall thickness.   4. Spectral Doppler shows impaired relaxation pattern of left   ventricular myocardial filling (Grade I diastolic dysfunction).   5. Mild mitral annular calcification.   6. Thickening of the anterior and posterior mitral valve leaflets.   7. Tricuspid valve appears mildly thickened.   8. Moderate to severe aortic regurgitation.   9. Sclerotic aortic valve with normal opening.  10. LA volume Index is 47.5 ml/m² ml/m2.  11. Moderately enlarged left atrium.

## 2017-03-22 NOTE — ED ADULT NURSE REASSESSMENT NOTE - EYE APPEARANCE
Right eye with increased lacrimation and erythemic conjunctiva, pt reports x 2 weeks and feels vision is blurry

## 2017-03-22 NOTE — DISCHARGE NOTE ADULT - SECONDARY DIAGNOSIS.
Atrial fibrillation, unspecified type Diabetes Essential hypertension Dysarthria due to recent cerebrovascular accident High cholesterol

## 2017-03-22 NOTE — DISCHARGE NOTE ADULT - NS AS DC STROKE ED MATERIALS
Stroke Education Booklet/Need for Followup After Discharge/Risk Factors for Stroke/Prescribed Medications/Stroke Warning Signs and Symptoms/Call 911 for Stroke

## 2017-03-22 NOTE — PROGRESS NOTE ADULT - ASSESSMENT
1. acute cva-awaiting MRI today. In the setting of paroxysmal a-fib an embolic cva is likely. No bleeding seen on CT. Pt needs chronic anticoagulation. Will start Eliquis 5 bid-discussed w pt.  2. Will cancel DEYANIRA as pt has documented Parox AF and will require AC regardless of DEYANIRA results.  3. will increase lopressor to lessen risk of af-b-watch for bradycardia. Adding amioodarone is also a consideration but it has long range toxicities.  4. chronic AI

## 2017-03-22 NOTE — DISCHARGE NOTE ADULT - MEDICATION SUMMARY - MEDICATIONS TO TAKE
I will START or STAY ON the medications listed below when I get home from the hospital:    .  -- Speech therapy   Dx : Acute CVA  -- Indication: For Cerebrovascular accident (CVA), unspecified mechanism    .  -- Occupational Therapy   Diagnosis - ACute CVA  -- Indication: For Cerebrovascular accident (CVA), unspecified mechanism    Aspirin Low Dose 81 mg oral delayed release tablet  -- 1 tab(s) by mouth once a day  -- Indication: For Cerebrovascular accident (CVA), unspecified mechanism    amiodarone 200 mg oral tablet  -- 1 tab(s) by mouth once a day  -- Avoid grapefruit and grapefruit juice while taking this medication.  Avoid prolonged or excessive exposure to direct and/or artificial sunlight while taking this medication.  Do not take this drug if you are pregnant.  It is very important that you take or use this exactly as directed.  Do not skip doses or discontinue unless directed by your doctor.  May cause drowsiness or dizziness.    -- Indication: For Atrial fibrillation    apixaban 5 mg oral tablet  -- 1 tab(s) by mouth 2 times a day  -- Indication: For Atrial fibrillation    metFORMIN 500 mg oral tablet  -- 1 tab(s) by mouth once a day  -- Indication: For Diabetes    atorvastatin 40 mg oral tablet  -- 1 tab(s) by mouth once a day  -- Indication: For Cerebrovascular accident (CVA), unspecified mechanism    metoprolol tartrate 50 mg oral tablet  -- 1 tab(s) by mouth 2 times a day  -- Indication: For Atrial fibrillation    oxybutynin 5 mg oral tablet  -- 1 tab(s) by mouth once a day  -- Indication: For Home meds    PreserVision oral tablet  -- 1 tab(s) by mouth 2 times a day  -- Indication: For Home meds    Metanx oral tablet  -- 1 tab(s) by mouth 2 times a day  -- Indication: For Home meds
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
## 2017-03-22 NOTE — DISCHARGE NOTE ADULT - HOSPITAL COURSE
83 y F hx HTN, HLD, DM2, h/o PAF presented to ER c/o slurred speech, L hand numbness admitted due to CVA, CT head showed extensive small vessel ischemic changes Was seen by Neurology input. , MRI/MRA - s/o IMPRESSION:  small acute infarct right basal ganglia extending into the right   frontal periventricular white matter..2)  multifocal chronic ischemic changes with volume loss..   TTE - LVEF 50-55%, normal global left ventricular systolic function , carotid doppler - negative , LDL 96, was seen by cardiology, she went to A.fib, started on Eliquis for paroxysmal AFib with rate control with metop and amiodarone.  She was advised to f/ u with speech therapy and OT . close f/u with PMD and Cardio in 1-2 weeks  Discussed with family and pt at bedside. 83 y F hx HTN, HLD, DM2, h/o PAF presented to ER c/o slurred speech, L hand numbness admitted due to CVA, CT head showed extensive small vessel ischemic changes Was seen by Neurology input. , MRI/MRA - s/o IMPRESSION:  small acute infarct right basal ganglia extending into the right   frontal periventricular white matter..2)  multifocal chronic ischemic changes with volume loss..   TTE - LVEF 50-55%, normal global left ventricular systolic function , carotid doppler - negative , LDL 96, was seen by cardiology, she went to A.fib, started on Eliquis for paroxysmal AFib with rate control with metop and amiodarone.  She was advised to f/ u with speech therapy and OT . close f/u with PMD and Cardio in 1-2 weeks  Discussed with family and pt at bedside.    Time spent in discharge planning and co-ordination : 45min

## 2017-03-22 NOTE — ED ADULT NURSE REASSESSMENT NOTE - GENITOURINARY WDL
Bladder non-tender and non-distended. Urine clear yellow.
Bladder non-tender and non-distended. Urine not visualized.
Bladder non-tender and non-distended. Urine clear yellow.

## 2017-03-22 NOTE — PROGRESS NOTE ADULT - ASSESSMENT
83 y F hx HTN, HLD, DM2, h/o PAF presented to ER c/o slurred speech, L hand numbness admitted due to CVA, CT head showed extensive small vessel ischemic changes    A/P    > CVA: Appreciate Neurology input - cont, to have mild dysarthia - NIH 1  CT w extensive small vessel ischemic changes, no acute infarct or hemorrhage seen,   MRI/MRA pending  TTE - LVEF 50-55%, normal global left ventricular systolic function   carotid doppler - negative , LDL 96  appreciate cardiology input, cancelled DEYANIRA, went to A.fib last night, started on Eliquis   cont statin.     > HTN: stable  started on Metoprolol by cardiology due to A.fib with RVR last night     >A. fib - rate controlled  started on Metoprolol 50 q8h, Eliquis   monitor tele       > DM: Stable  monitor FS, sliding scale  A1c : 5.8     > Prophyl: Eliquis

## 2017-03-22 NOTE — DISCHARGE NOTE ADULT - PATIENT PORTAL LINK FT
“You can access the [employer] Patient Portal, offered by [facility], by registering with the following website: [email or web address]”

## 2017-03-22 NOTE — PROGRESS NOTE ADULT - SUBJECTIVE AND OBJECTIVE BOX
Chart reviewed    Patient is stable  VSS afib    Dr Archibald note reviewed  MRI, MRA studies still pending    Imp  Cardioembolic (likely) TIA/CVA  Agree with anticoagulant therapy

## 2017-03-22 NOTE — ED ADULT NURSE REASSESSMENT NOTE - NS ED NURSE REASSESS COMMENT FT1
Dr. Peñaloza, neuro, at bedside
Pt resting comfortably on stretcher, in no apparent distress at this time. , daughters at bedside and updated on plan of care. Pt may eat at this time per Dr. Muñoz, to be NPO except meds after midnight tonight for DEYANIRA tomorrow. Pt c/o 6/10 headache. Dr. Thomas made aware, will medicate per MD orders.
Report given and pt endorsed to receiving 4 Bracket RN Rossy for follow up and continuity of care.
pt tachycardic/ rapid afib on the monitor denies chest pain or difficulty breathing, b/p 143/98, Dr De La Cruz contacted and made aware, verbal order for cardizem 10mg ivp requested.
Call back received from Dr. Vance, advised to continue to monitor, call if systolic is >220, orders noted.
Dr Archibald, Cardiology, at ECHO to evaluate pt.  Per MD manual BP was 120/72, pt remains A & Ox3, ECHO in progress.
Pt returned from CT of chest, Physical Therapy at bedside, pt ambulating with physical therapy to the bathroom.
Report recvd from off going RN, pt recvd lying comfortably on stretcher, denies pain, pt reports continued blurry vision, slurred speech noted, BP elevated, call made to admitting physician, awaiting call back and orders.  Safety and comfort measures in place, pt educated on safety precautions and use of call bell for assistance with ambulating.
Dr Mcdaniel at bedside
Assumed pt care at this time. Pt sleeping, easy to wake, Pt respirations even & unlabored, vitals WNL. Pt continues to wait for room assignment, aware of plan of care, will continue to monitor.
Pt provided dinner tray, tolerated well. Pt remains A & Ox3, respirations even & unlabored. Will continue to monitor.
Returned from Echo & Dopplers, pt placed back on cardiac monitor. Pt offers no complaints at this time. Pt's daughter remains at bedside, Will continue to monitor pt until pt is transferred to floor, pt and family aware of plan of care.
Pt. sleeping comfortably on bedside monitor. Throughout the night pt. experienced occasional runs of PVCs, 6 seconds strips obtained from monitor tech and placed in pt. chart. Pt. denies chest pain, SOB, or dizziness, MD epps made aware of rhythms. continue to monitor and maintain safety
Pt. received at 1930, placed on cardiac bedside monitor, found to be in sinus tachycardia 150-170 on monitor. rhythm reviewed and signed off with monitor MD supriya galeano notified and brought to bedside, EKG performed. will revise plan of care and continue to monitor
Report received at change of shift and assumed care of pt at that time. Pt received on stretcher in no acute distress, vss, offers no complaints. Pt with fall precautions in place. Pt NPO since yesterday for DEYANIRA as ordered. Message left for echo department for status. MRI called and aware that pt is ready to go for test. Pt made aware of above. Will continue to monitor.

## 2017-03-22 NOTE — DISCHARGE NOTE ADULT - CARE PROVIDER_API CALL
Dar Carroll (MD), Cardiovascular Disease; Internal Medicine  [address] [place] [address]  Phone: [phone]  Fax: [phone]    Chanelle Medina (), Family Medicine  [address] [place] [address]  Phone: [phone]  Fax: [phone]

## 2017-03-22 NOTE — DISCHARGE NOTE ADULT - NS AS DC FOLLOWUP STROKE INST
Stroke (includes: TIA/SAH/ICH/Ischemic Stroke) Stroke (includes: TIA/SAH/ICH/Ischemic Stroke)/Influenza vaccination (VIS Pub Date: August 19, 2014)

## 2017-03-23 VITALS — TEMPERATURE: 98 F

## 2017-03-23 DIAGNOSIS — I69.322 DYSARTHRIA FOLLOWING CEREBRAL INFARCTION: ICD-10-CM

## 2017-03-23 PROCEDURE — 93306 TTE W/DOPPLER COMPLETE: CPT

## 2017-03-23 PROCEDURE — 83735 ASSAY OF MAGNESIUM: CPT

## 2017-03-23 PROCEDURE — 85027 COMPLETE CBC AUTOMATED: CPT

## 2017-03-23 PROCEDURE — 36415 COLL VENOUS BLD VENIPUNCTURE: CPT

## 2017-03-23 PROCEDURE — 70551 MRI BRAIN STEM W/O DYE: CPT

## 2017-03-23 PROCEDURE — 97163 PT EVAL HIGH COMPLEX 45 MIN: CPT

## 2017-03-23 PROCEDURE — 70450 CT HEAD/BRAIN W/O DYE: CPT

## 2017-03-23 PROCEDURE — 83605 ASSAY OF LACTIC ACID: CPT

## 2017-03-23 PROCEDURE — 93880 EXTRACRANIAL BILAT STUDY: CPT

## 2017-03-23 PROCEDURE — 70544 MR ANGIOGRAPHY HEAD W/O DYE: CPT

## 2017-03-23 PROCEDURE — 99239 HOSP IP/OBS DSCHRG MGMT >30: CPT

## 2017-03-23 PROCEDURE — 97167 OT EVAL HIGH COMPLEX 60 MIN: CPT

## 2017-03-23 PROCEDURE — 93005 ELECTROCARDIOGRAM TRACING: CPT

## 2017-03-23 PROCEDURE — 80053 COMPREHEN METABOLIC PANEL: CPT

## 2017-03-23 PROCEDURE — 71250 CT THORAX DX C-: CPT

## 2017-03-23 PROCEDURE — 84100 ASSAY OF PHOSPHORUS: CPT

## 2017-03-23 PROCEDURE — 90686 IIV4 VACC NO PRSV 0.5 ML IM: CPT

## 2017-03-23 PROCEDURE — 83036 HEMOGLOBIN GLYCOSYLATED A1C: CPT

## 2017-03-23 PROCEDURE — 84484 ASSAY OF TROPONIN QUANT: CPT

## 2017-03-23 PROCEDURE — 99291 CRITICAL CARE FIRST HOUR: CPT | Mod: 25

## 2017-03-23 PROCEDURE — 80048 BASIC METABOLIC PNL TOTAL CA: CPT

## 2017-03-23 PROCEDURE — 71045 X-RAY EXAM CHEST 1 VIEW: CPT

## 2017-03-23 PROCEDURE — 80061 LIPID PANEL: CPT

## 2017-03-23 PROCEDURE — 70547 MR ANGIOGRAPHY NECK W/O DYE: CPT

## 2017-03-23 RX ORDER — METOPROLOL TARTRATE 50 MG
1 TABLET ORAL
Qty: 0 | Refills: 0 | COMMUNITY

## 2017-03-23 RX ORDER — METOPROLOL TARTRATE 50 MG
1 TABLET ORAL
Qty: 90 | Refills: 0 | OUTPATIENT
Start: 2017-03-23 | End: 2017-04-22

## 2017-03-23 RX ORDER — METOPROLOL TARTRATE 50 MG
1 TABLET ORAL
Qty: 60 | Refills: 0 | OUTPATIENT
Start: 2017-03-23 | End: 2017-04-22

## 2017-03-23 RX ORDER — AMIODARONE HYDROCHLORIDE 400 MG/1
1 TABLET ORAL
Qty: 30 | Refills: 0
Start: 2017-03-23 | End: 2017-04-22

## 2017-03-23 RX ADMIN — APIXABAN 5 MILLIGRAM(S): 2.5 TABLET, FILM COATED ORAL at 05:00

## 2017-03-23 RX ADMIN — PANTOPRAZOLE SODIUM 40 MILLIGRAM(S): 20 TABLET, DELAYED RELEASE ORAL at 05:00

## 2017-03-23 RX ADMIN — Medication 50 MILLIGRAM(S): at 05:00

## 2017-03-23 RX ADMIN — Medication 50 MILLIGRAM(S): at 15:24

## 2017-03-23 RX ADMIN — Medication 5 MILLIGRAM(S): at 11:45

## 2017-03-23 RX ADMIN — INFLUENZA VIRUS VACCINE 0.5 MILLILITER(S): 15; 15; 15; 15 SUSPENSION INTRAMUSCULAR at 16:07

## 2017-03-23 NOTE — PROGRESS NOTE ADULT - SUBJECTIVE AND OBJECTIVE BOX
chief complaint of slurred speech     HPI:  83 y F hx PAF, DM2, HTN, HLD, presented to ER c/o awoke this AM with slurred speech,  L hand numbness. Denies CP,SOB, palpitations, F/C, h/a, N/V/D, dysuria, focal weakness. Mild blurry vision. Denies any prior episodes. ER discussed w stroke neuro, deemed not a candidate for TPA. (20 Mar 2017 12:10      INTERVAL HPI/ OVERNIGHT EVENTS: Patient is seen and examined,   cont. to report mild dysarthria     REVIEW OF SYSTEMS:    Denied fever, chills, abd. pain, nausea, vomiting, chest pain, SOB, headache, dizziness    PHYSICAL EXAM:    Vital Signs Last 24 Hrs  T(C): 36.6, Max: 36.9 (03-22 @ 20:00)  T(F): 97.8, Max: 98.5 (03-22 @ 20:00)  HR: 82 (61 - 82)  BP: 117/63 (93/63 - 159/79)  BP(mean): 80 (69 - 102)  RR: 15 (15 - 21)  SpO2: 100% (93% - 100%)    GENERAL: NAD  HEENT: EOMI  Neck: supple  CHEST/LUNG: Clear to percussion bilaterally; No wheezing  HEART: S1S2+ audible  ABDOMEN: Soft, Nontender, Nondistended; Bowel sounds present  EXTREMITIES:  no edema  CNS: AAO X 3, slight flatteing of left NLF, mild dysarthria,  power 5/5, sensation intact   Psychiatry: normal mood    LABS:                        12.5   7.1   )-----------( 173      ( 22 Mar 2017 06:34 )             38.2     22 Mar 2017 06:34    141    |  103    |  19.0   ----------------------------<  104    4.2     |  26.0   |  0.62     Ca    8.7        22 Mar 2017 06:34  Phos  3.2       22 Mar 2017 06:34  Mg     2.1       22 Mar 2017 06:34    MEDICATIONS  (STANDING):  atorvastatin 40milliGRAM(s) Oral at bedtime  oxybutynin 5milliGRAM(s) Oral daily  insulin lispro (HumaLOG) corrective regimen sliding scale  SubCutaneous three times a day before meals  dextrose 5%. 1000milliLiter(s) IV Continuous <Continuous>  dextrose 50% Injectable 12.5Gram(s) IV Push once  dextrose 50% Injectable 25Gram(s) IV Push once  dextrose 50% Injectable 25Gram(s) IV Push once  apixaban 5milliGRAM(s) Oral two times a day  metoprolol 50milliGRAM(s) Oral three times a day  pantoprazole    Tablet 40milliGRAM(s) Oral before breakfast  influenza   Vaccine 0.5milliLiter(s) IntraMuscular once    MEDICATIONS  (PRN):  dextrose Gel 1Dose(s) Oral once PRN Blood Glucose LESS THAN 70 milliGRAM(s)/deciliter  glucagon  Injectable 1milliGRAM(s) IntraMuscular once PRN Glucose LESS THAN 70 milligrams/deciliter  acetaminophen   Tablet. 650milliGRAM(s) Oral every 6 hours PRN Moderate Pain (4 - 6)        RADIOLOGY & ADDITIONAL TEST  EXAM:  ECHO TRANSTHORACIC COMP W DOPP    PROCEDURE DATE:  Mar 20 2017    Summary:   1. Left ventricular ejection fraction, by visual estimation, is 50 to   55%.   2. Normal global left ventricular systolic function.   3. Increased LV wall thickness.   4. Spectral Doppler shows impaired relaxation pattern of left   ventricular myocardial filling (Grade I diastolic dysfunction).   5. Mild mitral annular calcification.   6. Thickening of the anterior and posterior mitral valve leaflets.   7. Tricuspid valve appears mildly thickened.   8. Moderate to severe aortic regurgitation.   9. Sclerotic aortic valve with normal opening.  10. LA volume Index is 47.5 ml/m² ml/m2.  11. Moderately enlarged left atrium.

## 2017-03-23 NOTE — PROGRESS NOTE ADULT - SUBJECTIVE AND OBJECTIVE BOX
INTERVAL HISTORY:  stable, mild dysarthria persists      VITAL SIGNS:  Vital Signs Last 24 Hrs  T(C): 36.3, Max: 36.9 (03-22 @ 20:00)  T(F): 97.3, Max: 98.5 (03-22 @ 20:00)  HR: 70 (61 - 79)  BP: 149/63 (130/55 - 159/79)  BP(mean): 80 (77 - 102)  RR: 15 (15 - 28)  SpO2: 99% (93% - 99%)    PHYSICAL EXAMINATION:    Mentation:  awake cooeprative  Language/Speech: mild dysarthria  CN:nl  Visual Fields:nl  Motor:nl  Sensory:nl  DTR:  Babinski:      MEDS:  MEDICATIONS  (STANDING):  atorvastatin 40milliGRAM(s) Oral at bedtime  oxybutynin 5milliGRAM(s) Oral daily  insulin lispro (HumaLOG) corrective regimen sliding scale  SubCutaneous three times a day before meals  dextrose 5%. 1000milliLiter(s) IV Continuous <Continuous>  dextrose 50% Injectable 12.5Gram(s) IV Push once  dextrose 50% Injectable 25Gram(s) IV Push once  dextrose 50% Injectable 25Gram(s) IV Push once  apixaban 5milliGRAM(s) Oral two times a day  metoprolol 50milliGRAM(s) Oral three times a day  pantoprazole    Tablet 40milliGRAM(s) Oral before breakfast  influenza   Vaccine 0.5milliLiter(s) IntraMuscular once    MEDICATIONS  (PRN):  dextrose Gel 1Dose(s) Oral once PRN Blood Glucose LESS THAN 70 milliGRAM(s)/deciliter  glucagon  Injectable 1milliGRAM(s) IntraMuscular once PRN Glucose LESS THAN 70 milligrams/deciliter  acetaminophen   Tablet. 650milliGRAM(s) Oral every 6 hours PRN Moderate Pain (4 - 6)      LABS:                          12.5   7.1   )-----------( 173      ( 22 Mar 2017 06:34 )             38.2     22 Mar 2017 06:34    141    |  103    |  19.0   ----------------------------<  104    4.2     |  26.0   |  0.62     Ca    8.7        22 Mar 2017 06:34  Phos  3.2       22 Mar 2017 06:34  Mg     2.1       22 Mar 2017 06:34            RADIOLOGY & ADDITIONAL STUDIES:    MRI  1 )  small acute infarct right basal ganglia extending into the right   frontal periventricular white matter..  2)  multifocal chronic ischemic changes with volume loss..             IMPRESSION & PLAN:  Right basal ganlgia infarct- cardioembolic  Afib    Rec:  Eliquis  OK for D/c  Follow up prn

## 2017-03-23 NOTE — PROGRESS NOTE ADULT - ASSESSMENT
83 y F hx HTN, HLD, DM2, h/o PAF presented to ER c/o slurred speech, L hand numbness admitted due to CVA, CT head showed extensive small vessel ischemic changes    A/P    > CVA: appreciate cardiology input, cancelled DEYANIRA, on Eliquis for Paroxsymal Afib  cont statin.     > HTN: stable      >A. fib - rate controlled  discussed with cardiology - dc on metop 50 q12 and amiodarone 200mg daily for maintainence        > DM: Stable  monitor FS, sliding scale  A1c : 5.8     > Prophyl: Eliquis   stable for DC home

## 2017-03-23 NOTE — PROGRESS NOTE ADULT - SUBJECTIVE AND OBJECTIVE BOX
[facility], THE [facility]ENTER                                   [address] [place] [address]                                                      PHONE: [phone]                                                         FAX: [phone]  [email or web address]  ---------------------------------------------------------------------------------------------------------------------------------    Overnight events/patient complaints:  NAD     No Known Allergies    MEDICATIONS  (STANDING):  atorvastatin 40milliGRAM(s) Oral at bedtime  oxybutynin 5milliGRAM(s) Oral daily  insulin lispro (HumaLOG) corrective regimen sliding scale  SubCutaneous three times a day before meals  dextrose 5%. 1000milliLiter(s) IV Continuous <Continuous>  dextrose 50% Injectable 12.5Gram(s) IV Push once  dextrose 50% Injectable 25Gram(s) IV Push once  dextrose 50% Injectable 25Gram(s) IV Push once  apixaban 5milliGRAM(s) Oral two times a day  metoprolol 50milliGRAM(s) Oral three times a day  pantoprazole    Tablet 40milliGRAM(s) Oral before breakfast  influenza   Vaccine 0.5milliLiter(s) IntraMuscular once    MEDICATIONS  (PRN):  dextrose Gel 1Dose(s) Oral once PRN Blood Glucose LESS THAN 70 milliGRAM(s)/deciliter  glucagon  Injectable 1milliGRAM(s) IntraMuscular once PRN Glucose LESS THAN 70 milligrams/deciliter  acetaminophen   Tablet. 650milliGRAM(s) Oral every 6 hours PRN Moderate Pain (4 - 6)      Vital Signs Last 24 Hrs  T(C): 36.3, Max: 36.9 (03-22 @ 20:00)  T(F): 97.3, Max: 98.5 (03-22 @ 20:00)  HR: 68 (61 - 79)  BP: 93/63 (93/63 - 159/79)  BP(mean): 69 (69 - 102)  RR: 16 (15 - 28)  SpO2: 100% (93% - 100%)  ICU Vital Signs Last 24 Hrs  ELAN MOSS  I&O's Detail  I & Os for 24h ending 23 Mar 2017 07:00  =============================================  IN:    Oral Fluid: 480 ml    Total IN: 480 ml  ---------------------------------------------  OUT:    Total OUT: 0 ml  ---------------------------------------------  Total NET: 480 ml    I & Os for current day (as of 23 Mar 2017 15:13)  =============================================  IN:    Oral Fluid: 740 ml    Total IN: 740 ml  ---------------------------------------------  OUT:    Stool: 1 ml    Total OUT: 1 ml  ---------------------------------------------  Total NET: 739 ml    I&O's Summary  I & Os for 24h ending 23 Mar 2017 07:00  =============================================  IN: 480 ml / OUT: 0 ml / NET: 480 ml    I & Os for current day (as of 23 Mar 2017 15:13)  =============================================  IN: 740 ml / OUT: 1 ml / NET: 739 ml    Drug Dosing Weight  ELAN MOSS      PHYSICAL EXAM:  General: Appears well developed, well nourished alert and cooperative.  HEENT: Head; normocephalic, atraumatic.  Eyes: Pupils reactive, cornea wnl.  Neck: Supple, no nodes adenopathy, no NVD or carotid bruit or thyromegaly.  CARDIOVASCULAR: Normal S1 and S2, No murmur, rub, gallop or lift.   LUNGS: No rales, rhonchi or wheeze. Normal breath sounds bilaterally.  ABDOMEN: Soft, nontender without mass or organomegaly. bowel sounds normoactive.  EXTREMITIES: No clubbing, cyanosis or edema. Distal pulses wnl.   SKIN: warm and dry with normal turgor.  NEURO: Alert/oriented x 3/normal motor exam. No pathologic reflexes.    PSYCH: normal affect.        LABS:                        12.5   7.1   )-----------( 173      ( 22 Mar 2017 06:34 )             38.2     22 Mar 2017 06:34    141    |  103    |  19.0   ----------------------------<  104    4.2     |  26.0   |  0.62     Ca    8.7        22 Mar 2017 06:34  Phos  3.2       22 Mar 2017 06:34  Mg     2.1       22 Mar 2017 06:34      ELAN MOSS            RADIOLOGY & ADDITIONAL STUDIES:    INTERPRETATION OF TELEMETRY (personally reviewed):    ECG:    ECHO:  normal EF moderate to severe AI     ASSESSMENT AND PLAN:  In summary, ELAN MOSS is an 82y Female with past medical history significant for PAF HTN TIA     Plan  1.  Agree with long term AC  2.  Lopressor 50 mg BID  3.  Start low dose Amiodarone 200 mg po daily   4.  Out patient follow up next week

## 2018-02-10 ENCOUNTER — TRANSCRIPTION ENCOUNTER (OUTPATIENT)
Age: 83
End: 2018-02-10

## 2018-02-13 ENCOUNTER — INPATIENT (INPATIENT)
Facility: HOSPITAL | Age: 83
LOS: 3 days | Discharge: HOME | DRG: 286 | End: 2018-02-17
Attending: HOSPITALIST | Admitting: HOSPITALIST
Payer: MEDICARE

## 2018-02-13 VITALS
DIASTOLIC BLOOD PRESSURE: 87 MMHG | HEART RATE: 103 BPM | RESPIRATION RATE: 30 BRPM | WEIGHT: 149.91 LBS | SYSTOLIC BLOOD PRESSURE: 134 MMHG | OXYGEN SATURATION: 79 % | HEIGHT: 62 IN

## 2018-02-13 DIAGNOSIS — J18.9 PNEUMONIA, UNSPECIFIED ORGANISM: ICD-10-CM

## 2018-02-13 DIAGNOSIS — Z98.49 CATARACT EXTRACTION STATUS, UNSPECIFIED EYE: Chronic | ICD-10-CM

## 2018-02-13 DIAGNOSIS — Z98.89 OTHER SPECIFIED POSTPROCEDURAL STATES: Chronic | ICD-10-CM

## 2018-02-13 LAB
ALBUMIN SERPL ELPH-MCNC: 3.1 G/DL — LOW (ref 3.3–5.2)
ALP SERPL-CCNC: 66 U/L — SIGNIFICANT CHANGE UP (ref 40–120)
ALT FLD-CCNC: 9 U/L — SIGNIFICANT CHANGE UP
ANION GAP SERPL CALC-SCNC: 10 MMOL/L — SIGNIFICANT CHANGE UP (ref 5–17)
APPEARANCE UR: CLEAR — SIGNIFICANT CHANGE UP
APTT BLD: 30.6 SEC — SIGNIFICANT CHANGE UP (ref 27.5–37.4)
AST SERPL-CCNC: 26 U/L — SIGNIFICANT CHANGE UP
BACTERIA # UR AUTO: ABNORMAL
BASOPHILS # BLD AUTO: 0 K/UL — SIGNIFICANT CHANGE UP (ref 0–0.2)
BASOPHILS NFR BLD AUTO: 0.3 % — SIGNIFICANT CHANGE UP (ref 0–2)
BILIRUB SERPL-MCNC: 0.4 MG/DL — SIGNIFICANT CHANGE UP (ref 0.4–2)
BILIRUB UR-MCNC: NEGATIVE — SIGNIFICANT CHANGE UP
BUN SERPL-MCNC: 24 MG/DL — HIGH (ref 8–20)
CALCIUM SERPL-MCNC: 8.3 MG/DL — LOW (ref 8.6–10.2)
CHLORIDE SERPL-SCNC: 108 MMOL/L — HIGH (ref 98–107)
CK SERPL-CCNC: 58 U/L — SIGNIFICANT CHANGE UP (ref 25–170)
CK SERPL-CCNC: 89 U/L — SIGNIFICANT CHANGE UP (ref 25–170)
CO2 SERPL-SCNC: 22 MMOL/L — SIGNIFICANT CHANGE UP (ref 22–29)
COLOR SPEC: YELLOW — SIGNIFICANT CHANGE UP
CREAT SERPL-MCNC: 0.99 MG/DL — SIGNIFICANT CHANGE UP (ref 0.5–1.3)
DIFF PNL FLD: ABNORMAL
EOSINOPHIL # BLD AUTO: 0.2 K/UL — SIGNIFICANT CHANGE UP (ref 0–0.5)
EOSINOPHIL NFR BLD AUTO: 3 % — SIGNIFICANT CHANGE UP (ref 0–6)
EPI CELLS # UR: SIGNIFICANT CHANGE UP
GLUCOSE BLDC GLUCOMTR-MCNC: 126 MG/DL — HIGH (ref 70–99)
GLUCOSE BLDC GLUCOMTR-MCNC: 159 MG/DL — HIGH (ref 70–99)
GLUCOSE BLDC GLUCOMTR-MCNC: 192 MG/DL — HIGH (ref 70–99)
GLUCOSE BLDC GLUCOMTR-MCNC: 210 MG/DL — HIGH (ref 70–99)
GLUCOSE SERPL-MCNC: 146 MG/DL — HIGH (ref 70–115)
GLUCOSE UR QL: NEGATIVE MG/DL — SIGNIFICANT CHANGE UP
HCT VFR BLD CALC: 39.2 % — SIGNIFICANT CHANGE UP (ref 37–47)
HGB BLD-MCNC: 12.1 G/DL — SIGNIFICANT CHANGE UP (ref 12–16)
HYALINE CASTS # UR AUTO: ABNORMAL /LPF
INR BLD: 1.43 RATIO — HIGH (ref 0.88–1.16)
KETONES UR-MCNC: NEGATIVE — SIGNIFICANT CHANGE UP
LACTATE BLDV-MCNC: 2.3 MMOL/L — HIGH (ref 0.5–2)
LACTATE BLDV-MCNC: 3.1 MMOL/L — HIGH (ref 0.5–2)
LEUKOCYTE ESTERASE UR-ACNC: NEGATIVE — SIGNIFICANT CHANGE UP
LYMPHOCYTES # BLD AUTO: 2.8 K/UL — SIGNIFICANT CHANGE UP (ref 1–4.8)
LYMPHOCYTES # BLD AUTO: 34.8 % — SIGNIFICANT CHANGE UP (ref 20–55)
MAGNESIUM SERPL-MCNC: 3.1 MG/DL — HIGH (ref 1.6–2.6)
MCHC RBC-ENTMCNC: 27.6 PG — SIGNIFICANT CHANGE UP (ref 27–31)
MCHC RBC-ENTMCNC: 30.9 G/DL — LOW (ref 32–36)
MCV RBC AUTO: 89.3 FL — SIGNIFICANT CHANGE UP (ref 81–99)
MONOCYTES # BLD AUTO: 0.8 K/UL — SIGNIFICANT CHANGE UP (ref 0–0.8)
MONOCYTES NFR BLD AUTO: 9.6 % — SIGNIFICANT CHANGE UP (ref 3–10)
NEUTROPHILS # BLD AUTO: 4.2 K/UL — SIGNIFICANT CHANGE UP (ref 1.8–8)
NEUTROPHILS NFR BLD AUTO: 52 % — SIGNIFICANT CHANGE UP (ref 37–73)
NITRITE UR-MCNC: NEGATIVE — SIGNIFICANT CHANGE UP
NT-PROBNP SERPL-SCNC: HIGH PG/ML (ref 0–300)
PH UR: 5 — SIGNIFICANT CHANGE UP (ref 5–8)
PLATELET # BLD AUTO: 218 K/UL — SIGNIFICANT CHANGE UP (ref 150–400)
POTASSIUM SERPL-MCNC: 4.4 MMOL/L — SIGNIFICANT CHANGE UP (ref 3.5–5.3)
POTASSIUM SERPL-SCNC: 4.4 MMOL/L — SIGNIFICANT CHANGE UP (ref 3.5–5.3)
PROT SERPL-MCNC: 5.9 G/DL — LOW (ref 6.6–8.7)
PROT UR-MCNC: 30 MG/DL
PROTHROM AB SERPL-ACNC: 15.9 SEC — HIGH (ref 9.8–12.7)
RAPID RVP RESULT: SIGNIFICANT CHANGE UP
RBC # BLD: 4.39 M/UL — LOW (ref 4.4–5.2)
RBC # FLD: 14.7 % — SIGNIFICANT CHANGE UP (ref 11–15.6)
RBC CASTS # UR COMP ASSIST: SIGNIFICANT CHANGE UP /HPF (ref 0–4)
SODIUM SERPL-SCNC: 140 MMOL/L — SIGNIFICANT CHANGE UP (ref 135–145)
SP GR SPEC: 1.02 — SIGNIFICANT CHANGE UP (ref 1.01–1.02)
TROPONIN T SERPL-MCNC: 0.01 NG/ML — SIGNIFICANT CHANGE UP (ref 0–0.06)
TROPONIN T SERPL-MCNC: 0.02 NG/ML — SIGNIFICANT CHANGE UP (ref 0–0.06)
TROPONIN T SERPL-MCNC: 0.03 NG/ML — SIGNIFICANT CHANGE UP (ref 0–0.06)
TSH SERPL-MCNC: 1.29 UIU/ML — SIGNIFICANT CHANGE UP (ref 0.27–4.2)
UROBILINOGEN FLD QL: NEGATIVE MG/DL — SIGNIFICANT CHANGE UP
WBC # BLD: 8 K/UL — SIGNIFICANT CHANGE UP (ref 4.8–10.8)
WBC # FLD AUTO: 8 K/UL — SIGNIFICANT CHANGE UP (ref 4.8–10.8)
WBC UR QL: SIGNIFICANT CHANGE UP

## 2018-02-13 PROCEDURE — 99223 1ST HOSP IP/OBS HIGH 75: CPT

## 2018-02-13 PROCEDURE — 99291 CRITICAL CARE FIRST HOUR: CPT

## 2018-02-13 PROCEDURE — 71250 CT THORAX DX C-: CPT | Mod: 26

## 2018-02-13 PROCEDURE — 71045 X-RAY EXAM CHEST 1 VIEW: CPT | Mod: 26

## 2018-02-13 PROCEDURE — 93010 ELECTROCARDIOGRAM REPORT: CPT

## 2018-02-13 PROCEDURE — 93306 TTE W/DOPPLER COMPLETE: CPT | Mod: 26

## 2018-02-13 RX ORDER — ALBUTEROL 90 UG/1
2.5 AEROSOL, METERED ORAL
Qty: 0 | Refills: 0 | Status: DISCONTINUED | OUTPATIENT
Start: 2018-02-13 | End: 2018-02-17

## 2018-02-13 RX ORDER — ENOXAPARIN SODIUM 100 MG/ML
30 INJECTION SUBCUTANEOUS EVERY 24 HOURS
Qty: 0 | Refills: 0 | Status: DISCONTINUED | OUTPATIENT
Start: 2018-02-13 | End: 2018-02-13

## 2018-02-13 RX ORDER — TIOTROPIUM BROMIDE 18 UG/1
1 CAPSULE ORAL; RESPIRATORY (INHALATION) DAILY
Qty: 0 | Refills: 0 | Status: DISCONTINUED | OUTPATIENT
Start: 2018-02-13 | End: 2018-02-17

## 2018-02-13 RX ORDER — OXYBUTYNIN CHLORIDE 5 MG
5 TABLET ORAL DAILY
Qty: 0 | Refills: 0 | Status: DISCONTINUED | OUTPATIENT
Start: 2018-02-13 | End: 2018-02-13

## 2018-02-13 RX ORDER — ASPIRIN/CALCIUM CARB/MAGNESIUM 324 MG
81 TABLET ORAL DAILY
Qty: 0 | Refills: 0 | Status: DISCONTINUED | OUTPATIENT
Start: 2018-02-13 | End: 2018-02-17

## 2018-02-13 RX ORDER — IPRATROPIUM/ALBUTEROL SULFATE 18-103MCG
3 AEROSOL WITH ADAPTER (GRAM) INHALATION EVERY 4 HOURS
Qty: 0 | Refills: 0 | Status: DISCONTINUED | OUTPATIENT
Start: 2018-02-13 | End: 2018-02-17

## 2018-02-13 RX ORDER — DEXTROSE 50 % IN WATER 50 %
12.5 SYRINGE (ML) INTRAVENOUS ONCE
Qty: 0 | Refills: 0 | Status: DISCONTINUED | OUTPATIENT
Start: 2018-02-13 | End: 2018-02-17

## 2018-02-13 RX ORDER — GLUCAGON INJECTION, SOLUTION 0.5 MG/.1ML
1 INJECTION, SOLUTION SUBCUTANEOUS ONCE
Qty: 0 | Refills: 0 | Status: DISCONTINUED | OUTPATIENT
Start: 2018-02-13 | End: 2018-02-17

## 2018-02-13 RX ORDER — APIXABAN 2.5 MG/1
2.5 TABLET, FILM COATED ORAL EVERY 12 HOURS
Qty: 0 | Refills: 0 | Status: DISCONTINUED | OUTPATIENT
Start: 2018-02-13 | End: 2018-02-16

## 2018-02-13 RX ORDER — SODIUM CHLORIDE 9 MG/ML
2500 INJECTION INTRAMUSCULAR; INTRAVENOUS; SUBCUTANEOUS ONCE
Qty: 0 | Refills: 0 | Status: COMPLETED | OUTPATIENT
Start: 2018-02-13 | End: 2018-02-13

## 2018-02-13 RX ORDER — INSULIN LISPRO 100/ML
VIAL (ML) SUBCUTANEOUS AT BEDTIME
Qty: 0 | Refills: 0 | Status: DISCONTINUED | OUTPATIENT
Start: 2018-02-13 | End: 2018-02-17

## 2018-02-13 RX ORDER — AZITHROMYCIN 500 MG/1
500 TABLET, FILM COATED ORAL ONCE
Qty: 0 | Refills: 0 | Status: COMPLETED | OUTPATIENT
Start: 2018-02-13 | End: 2018-02-13

## 2018-02-13 RX ORDER — ATORVASTATIN CALCIUM 80 MG/1
40 TABLET, FILM COATED ORAL AT BEDTIME
Qty: 0 | Refills: 0 | Status: DISCONTINUED | OUTPATIENT
Start: 2018-02-13 | End: 2018-02-17

## 2018-02-13 RX ORDER — AZITHROMYCIN 500 MG/1
500 TABLET, FILM COATED ORAL EVERY 24 HOURS
Qty: 0 | Refills: 0 | Status: DISCONTINUED | OUTPATIENT
Start: 2018-02-13 | End: 2018-02-17

## 2018-02-13 RX ORDER — FUROSEMIDE 40 MG
20 TABLET ORAL
Qty: 0 | Refills: 0 | Status: DISCONTINUED | OUTPATIENT
Start: 2018-02-13 | End: 2018-02-15

## 2018-02-13 RX ORDER — FUROSEMIDE 40 MG
40 TABLET ORAL DAILY
Qty: 0 | Refills: 0 | Status: DISCONTINUED | OUTPATIENT
Start: 2018-02-13 | End: 2018-02-16

## 2018-02-13 RX ORDER — ASPIRIN/CALCIUM CARB/MAGNESIUM 324 MG
325 TABLET ORAL ONCE
Qty: 0 | Refills: 0 | Status: COMPLETED | OUTPATIENT
Start: 2018-02-13 | End: 2018-02-13

## 2018-02-13 RX ORDER — ALBUTEROL 90 UG/1
1 AEROSOL, METERED ORAL EVERY 4 HOURS
Qty: 0 | Refills: 0 | Status: DISCONTINUED | OUTPATIENT
Start: 2018-02-13 | End: 2018-02-17

## 2018-02-13 RX ORDER — MAGNESIUM SULFATE 500 MG/ML
2 VIAL (ML) INJECTION ONCE
Qty: 0 | Refills: 0 | Status: COMPLETED | OUTPATIENT
Start: 2018-02-13 | End: 2018-02-13

## 2018-02-13 RX ORDER — DEXTROSE 50 % IN WATER 50 %
25 SYRINGE (ML) INTRAVENOUS ONCE
Qty: 0 | Refills: 0 | Status: DISCONTINUED | OUTPATIENT
Start: 2018-02-13 | End: 2018-02-17

## 2018-02-13 RX ORDER — INSULIN LISPRO 100/ML
VIAL (ML) SUBCUTANEOUS
Qty: 0 | Refills: 0 | Status: DISCONTINUED | OUTPATIENT
Start: 2018-02-13 | End: 2018-02-17

## 2018-02-13 RX ORDER — LACTOBACILLUS ACIDOPHILUS 100MM CELL
1 CAPSULE ORAL
Qty: 0 | Refills: 0 | Status: DISCONTINUED | OUTPATIENT
Start: 2018-02-13 | End: 2018-02-17

## 2018-02-13 RX ORDER — OXYBUTYNIN CHLORIDE 5 MG
5 TABLET ORAL DAILY
Qty: 0 | Refills: 0 | Status: DISCONTINUED | OUTPATIENT
Start: 2018-02-13 | End: 2018-02-17

## 2018-02-13 RX ORDER — ALBUTEROL 90 UG/1
2.5 AEROSOL, METERED ORAL ONCE
Qty: 0 | Refills: 0 | Status: COMPLETED | OUTPATIENT
Start: 2018-02-13 | End: 2018-02-13

## 2018-02-13 RX ORDER — DEXTROSE 50 % IN WATER 50 %
1 SYRINGE (ML) INTRAVENOUS ONCE
Qty: 0 | Refills: 0 | Status: DISCONTINUED | OUTPATIENT
Start: 2018-02-13 | End: 2018-02-17

## 2018-02-13 RX ORDER — IPRATROPIUM/ALBUTEROL SULFATE 18-103MCG
3 AEROSOL WITH ADAPTER (GRAM) INHALATION ONCE
Qty: 0 | Refills: 0 | Status: COMPLETED | OUTPATIENT
Start: 2018-02-13 | End: 2018-02-13

## 2018-02-13 RX ORDER — CEFTRIAXONE 500 MG/1
1 INJECTION, POWDER, FOR SOLUTION INTRAMUSCULAR; INTRAVENOUS EVERY 24 HOURS
Qty: 0 | Refills: 0 | Status: DISCONTINUED | OUTPATIENT
Start: 2018-02-13 | End: 2018-02-17

## 2018-02-13 RX ORDER — METOPROLOL TARTRATE 50 MG
50 TABLET ORAL
Qty: 0 | Refills: 0 | Status: DISCONTINUED | OUTPATIENT
Start: 2018-02-13 | End: 2018-02-17

## 2018-02-13 RX ORDER — AMIODARONE HYDROCHLORIDE 400 MG/1
200 TABLET ORAL DAILY
Qty: 0 | Refills: 0 | Status: DISCONTINUED | OUTPATIENT
Start: 2018-02-13 | End: 2018-02-17

## 2018-02-13 RX ORDER — CEFTRIAXONE 500 MG/1
1 INJECTION, POWDER, FOR SOLUTION INTRAMUSCULAR; INTRAVENOUS ONCE
Qty: 0 | Refills: 0 | Status: COMPLETED | OUTPATIENT
Start: 2018-02-13 | End: 2018-02-13

## 2018-02-13 RX ORDER — SODIUM CHLORIDE 9 MG/ML
1000 INJECTION, SOLUTION INTRAVENOUS
Qty: 0 | Refills: 0 | Status: DISCONTINUED | OUTPATIENT
Start: 2018-02-13 | End: 2018-02-17

## 2018-02-13 RX ADMIN — Medication 125 MILLIGRAM(S): at 04:38

## 2018-02-13 RX ADMIN — CEFTRIAXONE 100 GRAM(S): 500 INJECTION, POWDER, FOR SOLUTION INTRAMUSCULAR; INTRAVENOUS at 04:38

## 2018-02-13 RX ADMIN — Medication 3 MILLILITER(S): at 16:30

## 2018-02-13 RX ADMIN — Medication 1 TABLET(S): at 17:11

## 2018-02-13 RX ADMIN — AMIODARONE HYDROCHLORIDE 200 MILLIGRAM(S): 400 TABLET ORAL at 06:57

## 2018-02-13 RX ADMIN — APIXABAN 2.5 MILLIGRAM(S): 2.5 TABLET, FILM COATED ORAL at 17:33

## 2018-02-13 RX ADMIN — ATORVASTATIN CALCIUM 40 MILLIGRAM(S): 80 TABLET, FILM COATED ORAL at 22:43

## 2018-02-13 RX ADMIN — Medication 50 MILLIGRAM(S): at 17:11

## 2018-02-13 RX ADMIN — Medication 20 MILLIGRAM(S): at 17:11

## 2018-02-13 RX ADMIN — Medication 325 MILLIGRAM(S): at 04:40

## 2018-02-13 RX ADMIN — SODIUM CHLORIDE 5000 MILLILITER(S): 9 INJECTION INTRAMUSCULAR; INTRAVENOUS; SUBCUTANEOUS at 06:50

## 2018-02-13 RX ADMIN — ALBUTEROL 2.5 MILLIGRAM(S): 90 AEROSOL, METERED ORAL at 03:41

## 2018-02-13 RX ADMIN — Medication 2: at 17:31

## 2018-02-13 RX ADMIN — Medication 3 MILLILITER(S): at 03:41

## 2018-02-13 RX ADMIN — Medication 40 MILLIGRAM(S): at 17:10

## 2018-02-13 RX ADMIN — Medication 81 MILLIGRAM(S): at 13:20

## 2018-02-13 RX ADMIN — Medication 50 MILLIGRAM(S): at 06:57

## 2018-02-13 RX ADMIN — Medication 50 GRAM(S): at 04:38

## 2018-02-13 RX ADMIN — AZITHROMYCIN 255 MILLIGRAM(S): 500 TABLET, FILM COATED ORAL at 04:40

## 2018-02-13 RX ADMIN — Medication 3 MILLILITER(S): at 20:28

## 2018-02-13 RX ADMIN — Medication 5 MILLIGRAM(S): at 13:23

## 2018-02-13 RX ADMIN — Medication 20 MILLIGRAM(S): at 13:24

## 2018-02-13 RX ADMIN — Medication 3 MILLILITER(S): at 08:33

## 2018-02-13 RX ADMIN — Medication 3 MILLILITER(S): at 12:45

## 2018-02-13 NOTE — CONSULT NOTE ADULT - ASSESSMENT
Assessment  Acute diastolic HF likley secondary to worsening AI vs ischemia  Htn  NIDDM  PVD/AAA  ascending  aortic aneurysm likely due to AI  PAF in SR on Amio Chadvasc score 5 on NOAC      Rec  aggressive diuresis  titrate down O2  cont afterload reduction and bP control  echo to assess EF and AI  eventual R and L cardiac cath  cont AC/amio and lopressor  further rec based on echo findings

## 2018-02-13 NOTE — CONSULT NOTE ADULT - SUBJECTIVE AND OBJECTIVE BOX
[facility], [facility]                                   [address] [place] [address]                                                      PHONE: [phone]                                                         FAX: [phone]  [email or web address]  ---------------------------------------------------------------------------------------------------------------------------------    Reason for Consult: CHF    HPI:  ELAN MOSS is an 83y Female with htn, hyperlipidemia, known AAA,  DM, PAF, remote CVA, mod AI, normal EF admitted with increasing dyspnea following URI.    PAST MEDICAL & SURGICAL HISTORY:  Abdominal aortic aneurysm  Atrial fibrillation  Overactive bladder  High cholesterol  Diabetes  Hypertension  H/O foot surgery  H/O cataract extraction      No Known Allergies      MEDICATIONS  (STANDING):  ALBUTerol    90 MICROgram(s) HFA Inhaler 1 Puff(s) Inhalation every 4 hours  ALBUTerol/ipratropium for Nebulization 3 milliLiter(s) Nebulizer every 4 hours  amiodarone    Tablet 200 milliGRAM(s) Oral daily  apixaban 2.5 milliGRAM(s) Oral every 12 hours  aspirin enteric coated 81 milliGRAM(s) Oral daily  atorvastatin 40 milliGRAM(s) Oral at bedtime  azithromycin  IVPB 500 milliGRAM(s) IV Intermittent every 24 hours  cefTRIAXone   IVPB 1 Gram(s) IV Intermittent every 24 hours  dextrose 5%. 1000 milliLiter(s) (50 mL/Hr) IV Continuous <Continuous>  dextrose 50% Injectable 12.5 Gram(s) IV Push once  dextrose 50% Injectable 25 Gram(s) IV Push once  dextrose 50% Injectable 25 Gram(s) IV Push once  furosemide   Injectable 40 milliGRAM(s) IV Push daily  insulin lispro (HumaLOG) corrective regimen sliding scale   SubCutaneous three times a day before meals  insulin lispro (HumaLOG) corrective regimen sliding scale   SubCutaneous at bedtime  lactobacillus acidophilus 1 Tablet(s) Oral two times a day with meals  methylPREDNISolone sodium succinate Injectable 40 milliGRAM(s) IV Push every 12 hours  metoprolol     tartrate 50 milliGRAM(s) Oral two times a day  oxybutynin XL 5 milliGRAM(s) Oral daily  tiotropium 18 MICROgram(s) Capsule 1 Capsule(s) Inhalation daily    MEDICATIONS  (PRN):  ALBUTerol    0.083% 2.5 milliGRAM(s) Nebulizer every 2 hours PRN Shortness of Breath and/or Wheezing  dextrose Gel 1 Dose(s) Oral once PRN Blood Glucose LESS THAN 70 milliGRAM(s)/deciliter  glucagon  Injectable 1 milliGRAM(s) IntraMuscular once PRN Glucose LESS THAN 70 milligrams/deciliter      Social History:  Cigarettes:     neg               Alchohol:  neg               Illicit Drug Abuse:  neg    neg FH CAD    ROS: Negative other than as mentioned in HPI.    Vital Signs Last 24 Hrs  T(C): 37.1 (13 Feb 2018 03:49), Max: 37.1 (13 Feb 2018 03:49)  T(F): 98.7 (13 Feb 2018 03:49), Max: 98.7 (13 Feb 2018 03:49)  HR: 73 (13 Feb 2018 04:57) (73 - 103)  BP: 138/65 (13 Feb 2018 04:57) (134/87 - 138/65)  BP(mean): --  RR: 24 (13 Feb 2018 06:04) (20 - 30)  SpO2: 100% (13 Feb 2018 06:04) (79% - 100%)  ICU Vital Signs Last 24 Hrs  ELAN MOSS  I&O's Detail    I&O's Summary    Drug Dosing Weight  ELAN MOSS      PHYSICAL EXAM:  General: Appears well developed, well nourished alert and cooperative.  HEENT: Head; normocephalic, atraumatic.  Eyes: Pupils reactive, cornea wnl.  Neck: Supple, no nodes adenopathy, no NVD or carotid bruit or thyromegaly.  CARDIOVASCULAR: Normal S1 and S2, No murmur, rub, gallop or lift.   LUNGS: No rales, rhonchi or wheeze. Normal breath sounds bilaterally.  ABDOMEN: Soft, nontender without mass or organomegaly. bowel sounds normoactive.  EXTREMITIES: No clubbing, cyanosis or edema. Distal pulses wnl.   SKIN: warm and dry with normal turgor.  NEURO: Alert/oriented x 3/normal motor exam. No pathologic reflexes.    PSYCH: normal affect.        LABS:                        12.1   8.0   )-----------( 218      ( 13 Feb 2018 04:21 )             39.2     02-13    140  |  108<H>  |  24.0<H>  ----------------------------<  146<H>  4.4   |  22.0  |  0.99    Ca    8.3<L>      13 Feb 2018 05:02  Mg     3.1     02-13    TPro  5.9<L>  /  Alb  3.1<L>  /  TBili  0.4  /  DBili  x   /  AST  26  /  ALT  9   /  AlkPhos  66  02-13    ELAN MASSA  CARDIAC MARKERS ( 13 Feb 2018 05:02 )  x     / 0.01 ng/mL / x     / x     / x          PT/INR - ( 13 Feb 2018 04:21 )   PT: 15.9 sec;   INR: 1.43 ratio         PTT - ( 13 Feb 2018 04:21 )  PTT:30.6 sec      RADIOLOGY & ADDITIONAL STUDIES:< from: CT Chest No Cont (02.13.18 @ 06:34) >    IMPRESSION:   New bilateral pleural effusions and likely pulmonary edema superimposed   on centrilobular emphysema. In the setting of cardiomegaly, congestive   heart failure is considered. Pneumonia should be excluded on a clinical   basis.    Stable ascending thoracic aortic aneurysm to 4.6 cm. Enlarged right   thyroid gland, unchanged.    5.5 cm upper abdominal aortic aneurysm.  Indeterminate 1.3 hyperdense   lesion right kidney.            < end of copied text >      INTERPRETATION OF TELEMETRY (personally reviewed):    ECG:      ECHO:< from: TTE Echo Complete w/Doppler (03.20.17 @ 15:21) >        Summary:   1. Left ventricular ejection fraction, by visual estimation, is 50 to   55%.   2. Normal global left ventricular systolic function.   3. Increased LV wall thickness.   4. Spectral Doppler shows impaired relaxation pattern of left   ventricular myocardial filling (Grade I diastolic dysfunction).   5. Mild mitral annular calcification.   6. Thickening of the anterior and posterior mitral valve leaflets.   7. Tricuspid valve appears mildly thickened.   8. Moderate to severe aortic regurgitation.   9. Sclerotic aortic valve with normal opening.  10. LA volume Index is 47.5 ml/m² ml/m2.  11. Moderately enlarged left atrium.     MD Vincent Electronically signed on 3/20/2017 at 4:50:03 PM                *** Final ***            < end of copied text > [facility], [facility]                                   [address] [place] [address]                                                      PHONE: [phone]                                                         FAX: [phone]  [email or web address]  ---------------------------------------------------------------------------------------------------------------------------------    Reason for Consult: CHF    HPI:  ELAN MOSS is an 83y Female with htn, hyperlipidemia, known AAA,  DM, PAF, remote CVA, mod AI, normal EF admitted with increasing dyspnea following URI.  Pt developed acute dysnea presented to Er, p[laced on BIPAP.  Currently comfortable.  Denies assoc cp or palps.  No h/o MI, no prior cardiac cath, reported neg ETT in past.    PAST MEDICAL & SURGICAL HISTORY:  Abdominal aortic aneurysm  Atrial fibrillation  Overactive bladder  High cholesterol  Diabetes  Hypertension  H/O foot surgery  H/O cataract extraction      No Known Allergies      MEDICATIONS  (STANDING):  ALBUTerol    90 MICROgram(s) HFA Inhaler 1 Puff(s) Inhalation every 4 hours  ALBUTerol/ipratropium for Nebulization 3 milliLiter(s) Nebulizer every 4 hours  amiodarone    Tablet 200 milliGRAM(s) Oral daily  apixaban 2.5 milliGRAM(s) Oral every 12 hours  aspirin enteric coated 81 milliGRAM(s) Oral daily  atorvastatin 40 milliGRAM(s) Oral at bedtime  azithromycin  IVPB 500 milliGRAM(s) IV Intermittent every 24 hours  cefTRIAXone   IVPB 1 Gram(s) IV Intermittent every 24 hours  dextrose 5%. 1000 milliLiter(s) (50 mL/Hr) IV Continuous <Continuous>  dextrose 50% Injectable 12.5 Gram(s) IV Push once  dextrose 50% Injectable 25 Gram(s) IV Push once  dextrose 50% Injectable 25 Gram(s) IV Push once  furosemide   Injectable 40 milliGRAM(s) IV Push daily  insulin lispro (HumaLOG) corrective regimen sliding scale   SubCutaneous three times a day before meals  insulin lispro (HumaLOG) corrective regimen sliding scale   SubCutaneous at bedtime  lactobacillus acidophilus 1 Tablet(s) Oral two times a day with meals  methylPREDNISolone sodium succinate Injectable 40 milliGRAM(s) IV Push every 12 hours  metoprolol     tartrate 50 milliGRAM(s) Oral two times a day  oxybutynin XL 5 milliGRAM(s) Oral daily  tiotropium 18 MICROgram(s) Capsule 1 Capsule(s) Inhalation daily    MEDICATIONS  (PRN):  ALBUTerol    0.083% 2.5 milliGRAM(s) Nebulizer every 2 hours PRN Shortness of Breath and/or Wheezing  dextrose Gel 1 Dose(s) Oral once PRN Blood Glucose LESS THAN 70 milliGRAM(s)/deciliter  glucagon  Injectable 1 milliGRAM(s) IntraMuscular once PRN Glucose LESS THAN 70 milligrams/deciliter      Social History:  Cigarettes:     neg               Alchohol:  neg               Illicit Drug Abuse:  neg    neg FH CAD    ROS: Negative other than as mentioned in HPI.    Vital Signs Last 24 Hrs  T(C): 37.1 (13 Feb 2018 03:49), Max: 37.1 (13 Feb 2018 03:49)  T(F): 98.7 (13 Feb 2018 03:49), Max: 98.7 (13 Feb 2018 03:49)  HR: 73 (13 Feb 2018 04:57) (73 - 103)  BP: 138/65 (13 Feb 2018 04:57) (134/87 - 138/65)  BP(mean): --  RR: 24 (13 Feb 2018 06:04) (20 - 30)  SpO2: 100% (13 Feb 2018 06:04) (79% - 100%)  ICU Vital Signs Last 24 Hrs  ELAN MOSS  I&O's Detail    I&O's Summary    Drug Dosing Weight  ELAN MOSS      PHYSICAL EXAM:  General: Appears well developed, well nourished alert and cooperative.  HEENT: Head; normocephalic, atraumatic.  Eyes: Pupils reactive, cornea wnl.  Neck: Supple, no nodes adenopathy, no NVD or carotid bruit or thyromegaly.  CARDIOVASCULAR: Normal S1 and S2, soft systolic murmur at base   LUNGS: No rales, rhonchi or wheeze. Normal breath sounds bilaterally.  ABDOMEN: Soft, nontender without mass or organomegaly. bowel sounds normoactive.  EXTREMITIES: No clubbing, cyanosis or edema. Distal pulses wnl.   SKIN: warm and dry with normal turgor.  NEURO: Alert/oriented x 3/normal motor exam. No pathologic reflexes.    PSYCH: normal affect.        LABS:                        12.1   8.0   )-----------( 218      ( 13 Feb 2018 04:21 )             39.2     02-13    140  |  108<H>  |  24.0<H>  ----------------------------<  146<H>  4.4   |  22.0  |  0.99    Ca    8.3<L>      13 Feb 2018 05:02  Mg     3.1     02-13    TPro  5.9<L>  /  Alb  3.1<L>  /  TBili  0.4  /  DBili  x   /  AST  26  /  ALT  9   /  AlkPhos  66  02-13    ELAN MASSA  CARDIAC MARKERS ( 13 Feb 2018 05:02 )  x     / 0.01 ng/mL / x     / x     / x          PT/INR - ( 13 Feb 2018 04:21 )   PT: 15.9 sec;   INR: 1.43 ratio         PTT - ( 13 Feb 2018 04:21 )  PTT:30.6 sec      RADIOLOGY & ADDITIONAL STUDIES:< from: CT Chest No Cont (02.13.18 @ 06:34) >    IMPRESSION:   New bilateral pleural effusions and likely pulmonary edema superimposed   on centrilobular emphysema. In the setting of cardiomegaly, congestive   heart failure is considered. Pneumonia should be excluded on a clinical   basis.    Stable ascending thoracic aortic aneurysm to 4.6 cm. Enlarged right   thyroid gland, unchanged.    5.5 cm upper abdominal aortic aneurysm.  Indeterminate 1.3 hyperdense   lesion right kidney.            < end of copied text >      INTERPRETATION OF TELEMETRY (personally reviewed):    ECG: SR LVH with assoc STT abnl PVCs      ECHO:< from: TTE Echo Complete w/Doppler (03.20.17 @ 15:21) >        Summary:   1. Left ventricular ejection fraction, by visual estimation, is 50 to   55%.   2. Normal global left ventricular systolic function.   3. Increased LV wall thickness.   4. Spectral Doppler shows impaired relaxation pattern of left   ventricular myocardial filling (Grade I diastolic dysfunction).   5. Mild mitral annular calcification.   6. Thickening of the anterior and posterior mitral valve leaflets.   7. Tricuspid valve appears mildly thickened.   8. Moderate to severe aortic regurgitation.   9. Sclerotic aortic valve with normal opening.  10. LA volume Index is 47.5 ml/m² ml/m2.  11. Moderately enlarged left atrium.     MD Vincent Electronically signed on 3/20/2017 at 4:50:03 PM                *** Final ***            < end of copied text >

## 2018-02-13 NOTE — ED ADULT NURSE REASSESSMENT NOTE - NEURO WDL
Alert and oriented to person, place and time, memory intact, behavior appropriate to situation, PERRL. AOX3

## 2018-02-13 NOTE — ED ADULT TRIAGE NOTE - CHIEF COMPLAINT QUOTE
Pt with difficulty breathing starting tonight "woke me up because I couldn't breathe", pt tachypneic, arrived on cpap, MD Araiza called to bedside

## 2018-02-13 NOTE — CONSULT NOTE ADULT - CONSULT REQUESTED DATE/TIME
Ice to right elbow after playing and if gets sore (20 minutes several times a day) todecrease any swelling or discomfort.    Follow up with Dr Akhtar or colleague about the elevated glucose soon.    Best Wishes!  
13-Feb-2018 07:56

## 2018-02-13 NOTE — H&P ADULT - ASSESSMENT
pt. is admitted for Dyspnea unspecified, possible combination of pneumonia, copd, chf ?   will get ct chest non contrast for better diffeential diagnosis.      Pneumonia, unspecified laterality, unspecified part of the lung, community acquired gram +ve cocci  likely organism, will continue with iv rocephin and zithromax,. follow blood / sputum cultures.     copd unspecified. will be on o2 support, duoneb,  solumedrol.     CHF, likely acute , unspecified systolic vs diastolic. will get echo to assess LV function and EF, pulmonary artery pressure.     AFIB chronic, will continue apaxiban as 2.5 mg po q 12hrs as of 5 mg po q 12hrs considering her age above 80. pt. is admitted for Dyspnea unspecified, possible combination of pneumonia, copd, chf ?   will get ct chest non contrast for better differential diagnosis.      Pneumonia, unspecified laterality, unspecified part of the lung, community acquired gram +ve cocci  likely organism, will continue with iv rocephin and zithromax,. follow blood / sputum cultures.     copd unspecified. will be on o2 support, duoneb,  solumedrol.     CHF, likely acute , unspecified systolic vs diastolic. will get echo to assess LV function and EF, pulmonary artery pressure. cardio consult [place] called.     AFIB chronic, will continue apaxiban as 2.5 mg po q 12hrs as of 5 mg po q 12hrs considering her age above 80.

## 2018-02-13 NOTE — PROGRESS NOTE ADULT - SUBJECTIVE AND OBJECTIVE BOX
is an 84 y/o female who was BIBA as she could not catch her breath in the middle of the night. She appears to have a mild heart failure exacerbation, given that her CT chest demonstrates signs of pulmonary edema and crackles. I've added lasix to her regimen.    vitals reviewed  physical exam not performed (as she was in testing).    a/p changed to add lasix, we're repeating labs and I will f/u tomm am.

## 2018-02-13 NOTE — H&P ADULT - NSHPPHYSICALEXAM_GEN_ALL_CORE
General:  female , medium build  lying in bed  in NAD.   HEENT: AT, NC. PERRL. intact EOM. no throat erythema or exudate.   Neck: supple. no JVD.   Chest: pt. has somewhat diminished BS, basal crackles. not using accessory muscles of respiration.   Heart: S1,S2 irregularly irregular.   Abdomen: soft. non-tender. non-distended. + BS.   Rectal : deferred at this point.  Ext: no C/C/E. no calf tenderness.   FROM of all ext.   Neuro: AAO x3. no focal weakness. no speech disorder  Skin: no warmth. no cyanosis. no diaphoresis. no obvious rash.   Psychiatric : normal.

## 2018-02-13 NOTE — ED ADULT NURSE REASSESSMENT NOTE - NS ED NURSE REASSESS COMMENT FT1
pt noted with increased shortness of breath, placed back on bipap
pt taken off BIPAP, tolerating nasal canula well, pt in no apparent distress at the moment, daughter at bedside, oxygen saturation at 97%. Pt denies any pain or discomfort.
pt in no apparent distress resting comfortably in bed, IV patent and flushing without difficulty no signs of infiltration. pt on BIPAP tolerating it well  oxygen at 98%.

## 2018-02-13 NOTE — ED PROVIDER NOTE - MEDICAL DECISION MAKING DETAILS
multilobar pneumonia with hypoxia able to titrate off NIPPV but needed orginally to prvent intubation will tx for CAP and admit

## 2018-02-13 NOTE — ED PROVIDER NOTE - OBJECTIVE STATEMENT
pt presents with dyspnea productive cough x 3 days found to be hypoxic 83 percent at home. denies fever. denies HA or neck pain. no chest pain + sob. no abd pain. no n/v/d. no urinary f/u/d. no back pain. no motor or sensory deficits. denies illicit drug use. no recent travel. no rash. no other acute issues symptoms or concerns

## 2018-02-13 NOTE — H&P ADULT - NSHPLABSRESULTS_GEN_ALL_CORE
cxr reviewed by me , possible multifocal infiltrate, small b/l pl. effusion, rt. more than left.   sinus 80 with sinus arrythmia, occasional pvc, non specific st- t changes.

## 2018-02-13 NOTE — H&P ADULT - HISTORY OF PRESENT ILLNESS
84 y/o female was BIBA as she could not catch her breath in the middle of the night. pt. was resting in her bed. Pt. has been experiencing mild sob for past 2 days which suddenly got worse. As per pt. she  was diagnosed with bronchitis and has been on an antibiotic fro past 3 days. reports dry cough. no fever. no sick contact. no cp. no abd. pain. no n/v/d. Upon arrival in the ER pt. was put on bipap which helped her breathing and at the time of admission pt. is off the bipap and is comfortable with o2 via NC and breathing status is stable.

## 2018-02-14 DIAGNOSIS — I50.21 ACUTE SYSTOLIC (CONGESTIVE) HEART FAILURE: ICD-10-CM

## 2018-02-14 LAB
ANION GAP SERPL CALC-SCNC: 13 MMOL/L — SIGNIFICANT CHANGE UP (ref 5–17)
BUN SERPL-MCNC: 21 MG/DL — HIGH (ref 8–20)
CALCIUM SERPL-MCNC: 8.5 MG/DL — LOW (ref 8.6–10.2)
CHLORIDE SERPL-SCNC: 100 MMOL/L — SIGNIFICANT CHANGE UP (ref 98–107)
CO2 SERPL-SCNC: 25 MMOL/L — SIGNIFICANT CHANGE UP (ref 22–29)
CREAT SERPL-MCNC: 0.79 MG/DL — SIGNIFICANT CHANGE UP (ref 0.5–1.3)
CULTURE RESULTS: NO GROWTH — SIGNIFICANT CHANGE UP
GLUCOSE BLDC GLUCOMTR-MCNC: 166 MG/DL — HIGH (ref 70–99)
GLUCOSE BLDC GLUCOMTR-MCNC: 186 MG/DL — HIGH (ref 70–99)
GLUCOSE SERPL-MCNC: 202 MG/DL — HIGH (ref 70–115)
HBA1C BLD-MCNC: 5.8 % — HIGH (ref 4–5.6)
HCT VFR BLD CALC: 35.9 % — LOW (ref 37–47)
HGB BLD-MCNC: 11.1 G/DL — LOW (ref 12–16)
MAGNESIUM SERPL-MCNC: 2 MG/DL — SIGNIFICANT CHANGE UP (ref 1.6–2.6)
MCHC RBC-ENTMCNC: 27 PG — SIGNIFICANT CHANGE UP (ref 27–31)
MCHC RBC-ENTMCNC: 30.9 G/DL — LOW (ref 32–36)
MCV RBC AUTO: 87.3 FL — SIGNIFICANT CHANGE UP (ref 81–99)
PHOSPHATE SERPL-MCNC: 3.6 MG/DL — SIGNIFICANT CHANGE UP (ref 2.4–4.7)
PLATELET # BLD AUTO: 193 K/UL — SIGNIFICANT CHANGE UP (ref 150–400)
POTASSIUM SERPL-MCNC: 4.4 MMOL/L — SIGNIFICANT CHANGE UP (ref 3.5–5.3)
POTASSIUM SERPL-SCNC: 4.4 MMOL/L — SIGNIFICANT CHANGE UP (ref 3.5–5.3)
RBC # BLD: 4.11 M/UL — LOW (ref 4.4–5.2)
RBC # FLD: 14.1 % — SIGNIFICANT CHANGE UP (ref 11–15.6)
SODIUM SERPL-SCNC: 138 MMOL/L — SIGNIFICANT CHANGE UP (ref 135–145)
SPECIMEN SOURCE: SIGNIFICANT CHANGE UP
WBC # BLD: 10.3 K/UL — SIGNIFICANT CHANGE UP (ref 4.8–10.8)
WBC # FLD AUTO: 10.3 K/UL — SIGNIFICANT CHANGE UP (ref 4.8–10.8)

## 2018-02-14 PROCEDURE — 99233 SBSQ HOSP IP/OBS HIGH 50: CPT

## 2018-02-14 RX ORDER — LISINOPRIL 2.5 MG/1
5 TABLET ORAL DAILY
Qty: 0 | Refills: 0 | Status: DISCONTINUED | OUTPATIENT
Start: 2018-02-14 | End: 2018-02-17

## 2018-02-14 RX ADMIN — CEFTRIAXONE 100 GRAM(S): 500 INJECTION, POWDER, FOR SOLUTION INTRAMUSCULAR; INTRAVENOUS at 10:32

## 2018-02-14 RX ADMIN — Medication 81 MILLIGRAM(S): at 12:36

## 2018-02-14 RX ADMIN — Medication 3 MILLILITER(S): at 04:14

## 2018-02-14 RX ADMIN — Medication 3 MILLILITER(S): at 00:50

## 2018-02-14 RX ADMIN — Medication 20 MILLIGRAM(S): at 17:27

## 2018-02-14 RX ADMIN — Medication 40 MILLIGRAM(S): at 17:27

## 2018-02-14 RX ADMIN — ATORVASTATIN CALCIUM 40 MILLIGRAM(S): 80 TABLET, FILM COATED ORAL at 23:14

## 2018-02-14 RX ADMIN — APIXABAN 2.5 MILLIGRAM(S): 2.5 TABLET, FILM COATED ORAL at 06:04

## 2018-02-14 RX ADMIN — Medication 1 TABLET(S): at 17:28

## 2018-02-14 RX ADMIN — Medication 3 MILLILITER(S): at 08:25

## 2018-02-14 RX ADMIN — Medication 2: at 12:34

## 2018-02-14 RX ADMIN — Medication 40 MILLIGRAM(S): at 06:04

## 2018-02-14 RX ADMIN — Medication 50 MILLIGRAM(S): at 17:28

## 2018-02-14 RX ADMIN — Medication 2: at 17:23

## 2018-02-14 RX ADMIN — Medication 5 MILLIGRAM(S): at 12:48

## 2018-02-14 RX ADMIN — Medication 50 MILLIGRAM(S): at 06:04

## 2018-02-14 RX ADMIN — Medication 3 MILLILITER(S): at 21:08

## 2018-02-14 RX ADMIN — Medication 3 MILLILITER(S): at 12:56

## 2018-02-14 RX ADMIN — AZITHROMYCIN 255 MILLIGRAM(S): 500 TABLET, FILM COATED ORAL at 06:06

## 2018-02-14 RX ADMIN — AMIODARONE HYDROCHLORIDE 200 MILLIGRAM(S): 400 TABLET ORAL at 06:04

## 2018-02-14 RX ADMIN — Medication 20 MILLIGRAM(S): at 06:04

## 2018-02-14 RX ADMIN — Medication 3 MILLILITER(S): at 16:09

## 2018-02-14 RX ADMIN — APIXABAN 2.5 MILLIGRAM(S): 2.5 TABLET, FILM COATED ORAL at 17:27

## 2018-02-14 RX ADMIN — Medication 0: at 23:14

## 2018-02-14 NOTE — PROGRESS NOTE ADULT - SUBJECTIVE AND OBJECTIVE BOX
Chief Complaint: chart and hx reviewed.    HPI: 84 yo F admitted with increasing dyspnea. Denies fever or sputum. Denies edema or orthopnea. PMH: former heavy smoker who quit a decade ago. Known thoracic and abd aortic aneurysms. Known significant AI on last yrs's echo (LV EF was nl.) Hx of hpt and diabetes but no MI cva renal heme thyroid hx.    PAST MEDICAL & SURGICAL HISTORY:  Abdominal aortic aneurysm  Atrial fibrillation  Overactive bladder  High cholesterol  Diabetes  Hypertension  H/O foot surgery  H/O cataract extraction      PREVIOUS DIAGNOSTIC TESTING:      ECHO  FINDINGS: LVEF 30-35% with grade II diastolic dysfunction. Moderate MR and TR with significant AI. Dilated atria and PASP 48.    STRESS  FINDINGS:    CATHETERIZATION  FINDINGS:    MEDICATIONS  (STANDING):  ALBUTerol    90 MICROgram(s) HFA Inhaler 1 Puff(s) Inhalation every 4 hours  ALBUTerol/ipratropium for Nebulization 3 milliLiter(s) Nebulizer every 4 hours  amiodarone    Tablet 200 milliGRAM(s) Oral daily  apixaban 2.5 milliGRAM(s) Oral every 12 hours  aspirin enteric coated 81 milliGRAM(s) Oral daily  atorvastatin 40 milliGRAM(s) Oral at bedtime  azithromycin  IVPB 500 milliGRAM(s) IV Intermittent every 24 hours  cefTRIAXone   IVPB 1 Gram(s) IV Intermittent every 24 hours  dextrose 5%. 1000 milliLiter(s) (50 mL/Hr) IV Continuous <Continuous>  dextrose 50% Injectable 12.5 Gram(s) IV Push once  dextrose 50% Injectable 25 Gram(s) IV Push once  dextrose 50% Injectable 25 Gram(s) IV Push once  furosemide   Injectable 40 milliGRAM(s) IV Push daily  furosemide   Injectable 20 milliGRAM(s) IV Push two times a day  insulin lispro (HumaLOG) corrective regimen sliding scale   SubCutaneous three times a day before meals  insulin lispro (HumaLOG) corrective regimen sliding scale   SubCutaneous at bedtime  lactobacillus acidophilus 1 Tablet(s) Oral two times a day with meals  methylPREDNISolone sodium succinate Injectable 40 milliGRAM(s) IV Push every 12 hours  metoprolol     tartrate 50 milliGRAM(s) Oral two times a day  oxybutynin XL 5 milliGRAM(s) Oral daily  tiotropium 18 MICROgram(s) Capsule 1 Capsule(s) Inhalation daily    MEDICATIONS  (PRN):  ALBUTerol    0.083% 2.5 milliGRAM(s) Nebulizer every 2 hours PRN Shortness of Breath and/or Wheezing  dextrose Gel 1 Dose(s) Oral once PRN Blood Glucose LESS THAN 70 milliGRAM(s)/deciliter  glucagon  Injectable 1 milliGRAM(s) IntraMuscular once PRN Glucose LESS THAN 70 milligrams/deciliter      FAMILY HISTORY:  No pertinent family history in first degree relatives      ROS: Negative other than as mentioned in HPI.    Vital Signs Last 24 Hrs  T(C): 36.5 (2018 05:17), Max: 98.4 (2018 17:08)  T(F): 97.7 (2018 05:17), Max: 209.1 (2018 17:08)  HR: 90 and reg. (2018 05:17) (59 - 119)  BP: 129/64 (2018 05:17) (129/60 - 148/68) no manual cuff in ER.  BP(mean): --  RR: 14 non-labored (2018 05:17) (18 - 23)  SpO2: 94% (2018 05:17) (94% - 100%)    PHYSICAL EXAM:  General: Appears well developed, well nourished alert and cooperative. afebrile elderly F nad.  HEENT: Head; normocephalic, atraumatic.  Eyes;   Pupils reactive, cornea wnl.  Neck; Supple, no nodes adenopathy, no NVD or carotid bruit or thyromegaly.  CARDIOVASCULAR; Soft basal systolic and diastolic murmur over aortic area. S2 + No rub, gallop or lift. Normal S1 and S2.  LUNGS; No rales, rhonchi or wheeze. Normal breath sounds bilaterally.  ABDOMEN ; Soft, nontender without mass or organomegaly. bowel sounds normoactive.  EXTREMITIES; No clubbing, cyanosis or edema. Distal pulses wnl. ROM normal.  SKIN; warm and dry with normal turgor.  NEURO; Alert/oriented x 3/normal motor exam. No pathologic reflexes.    PSYCH; normal affect.            INTERPRETATION OF TELEMETRY:    ECG: SR with mild NSST&T abnl  CXR +PVH and effusions  I&O's Detail      LABS:                        12.1   8.0   )-----------( 218      ( 2018 04:21 )             39.2     02-13    140  |  108<H>  |  24.0<H>  ----------------------------<  146<H>  4.4   |  22.0  |  0.99    Ca    8.3<L>      2018 05:02  Mg     3.1         TPro  5.9<L>  /  Alb  3.1<L>  /  TBili  0.4  /  DBili  x   /  AST  26  /  ALT  9   /  AlkPhos  66  02-13    CARDIAC MARKERS ( 2018 15:11 )  x     / 0.02 ng/mL / 89 U/L / x     / x      CARDIAC MARKERS ( 2018 09:13 )  x     / 0.03 ng/mL / 58 U/L / x     / x      CARDIAC MARKERS ( 2018 05:02 )  x     / 0.01 ng/mL / x     / x     / x          PT/INR - ( 2018 04:21 )   PT: 15.9 sec;   INR: 1.43 ratio         PTT - ( 2018 04:21 )  PTT:30.6 sec  Urinalysis Basic - ( 2018 13:53 )    Color: Yellow / Appearance: Clear / S.025 / pH: x  Gluc: x / Ketone: Negative  / Bili: Negative / Urobili: Negative mg/dL   Blood: x / Protein: 30 mg/dL / Nitrite: Negative   Leuk Esterase: Negative / RBC: 0-2 /HPF / WBC 0-2   Sq Epi: x / Non Sq Epi: Occasional / Bacteria: Occasional      I&O's Summary      RADIOLOGY & ADDITIONAL STUDIES: Chief Complaint: chart and hx reviewed.    HPI: 84 yo F admitted with increasing dyspnea. Denies fever or sputum. Denies edema or orthopnea. PMH: former heavy smoker who quit a decade ago. Known thoracic and abd aortic aneurysms. Known significant AI on last yrs's echo (LV EF was nl.) Hx of hpt and diabetes but no MI cva renal heme thyroid hx.    PAST MEDICAL & SURGICAL HISTORY:  Abdominal aortic aneurysm  Atrial fibrillation  Overactive bladder  High cholesterol  Diabetes  Hypertension  H/O foot surgery  H/O cataract extraction      PREVIOUS DIAGNOSTIC TESTING:      ECHO  FINDINGS: LVEF 30-35% with grade II diastolic dysfunction. Moderate MR and TR with significant AI. Dilated atria and PASP 48.    STRESS  FINDINGS:    CATHETERIZATION  FINDINGS:    MEDICATIONS  (STANDING):  ALBUTerol    90 MICROgram(s) HFA Inhaler 1 Puff(s) Inhalation every 4 hours  ALBUTerol/ipratropium for Nebulization 3 milliLiter(s) Nebulizer every 4 hours  amiodarone    Tablet 200 milliGRAM(s) Oral daily  apixaban 2.5 milliGRAM(s) Oral every 12 hours  aspirin enteric coated 81 milliGRAM(s) Oral daily  atorvastatin 40 milliGRAM(s) Oral at bedtime  azithromycin  IVPB 500 milliGRAM(s) IV Intermittent every 24 hours  cefTRIAXone   IVPB 1 Gram(s) IV Intermittent every 24 hours  dextrose 5%. 1000 milliLiter(s) (50 mL/Hr) IV Continuous <Continuous>  dextrose 50% Injectable 12.5 Gram(s) IV Push once  dextrose 50% Injectable 25 Gram(s) IV Push once  dextrose 50% Injectable 25 Gram(s) IV Push once  furosemide   Injectable 40 milliGRAM(s) IV Push daily  furosemide   Injectable 20 milliGRAM(s) IV Push two times a day  insulin lispro (HumaLOG) corrective regimen sliding scale   SubCutaneous three times a day before meals  insulin lispro (HumaLOG) corrective regimen sliding scale   SubCutaneous at bedtime  lactobacillus acidophilus 1 Tablet(s) Oral two times a day with meals  methylPREDNISolone sodium succinate Injectable 40 milliGRAM(s) IV Push every 12 hours  metoprolol     tartrate 50 milliGRAM(s) Oral two times a day  oxybutynin XL 5 milliGRAM(s) Oral daily  tiotropium 18 MICROgram(s) Capsule 1 Capsule(s) Inhalation daily    MEDICATIONS  (PRN):  ALBUTerol    0.083% 2.5 milliGRAM(s) Nebulizer every 2 hours PRN Shortness of Breath and/or Wheezing  dextrose Gel 1 Dose(s) Oral once PRN Blood Glucose LESS THAN 70 milliGRAM(s)/deciliter  glucagon  Injectable 1 milliGRAM(s) IntraMuscular once PRN Glucose LESS THAN 70 milligrams/deciliter      FAMILY HISTORY:  No pertinent family history in first degree relatives      ROS: Negative other than as mentioned in HPI.    Vital Signs Last 24 Hrs  T(C): 36.5 (2018 05:17), Max: 98.4 (2018 17:08)  T(F): 97.7 (2018 05:17), Max: 209.1 (2018 17:08)  HR: 90 and reg. (2018 05:17) (59 - 119)  BP: 129/64 (2018 05:17) (129/60 - 148/68) no manual cuff in ER.  BP(mean): --  RR: 14 non-labored (2018 05:17) (18 - 23)  SpO2: 94% (2018 05:17) (94% - 100%)    PHYSICAL EXAM:  General: Appears well developed, well nourished alert and cooperative. afebrile elderly F nad.  HEENT: Head; normocephalic, atraumatic.  Eyes;   Pupils reactive, cornea wnl.  Neck; Supple, no nodes adenopathy, no NVD or carotid bruit or thyromegaly.  CARDIOVASCULAR; Soft basal systolic and diastolic murmur over aortic area. S2 + No rub, gallop or lift. Normal S1 and S2.  LUNGS; No rales, rhonchi or wheeze. Normal breath sounds bilaterally.  ABDOMEN ; Soft, nontender without mass or organomegaly. bowel sounds normoactive.  EXTREMITIES; No clubbing, cyanosis or edema. Distal pulses wnl. ROM normal.  SKIN; warm and dry with normal turgor.  NEURO; Alert/oriented x 3/normal motor exam. No pathologic reflexes.    PSYCH; normal affect.            INTERPRETATION OF TELEMETRY:    ECG: SR with mild NSST&T abnl  CXR +PVH and effusions  I&O's Detail  CT of chest; centrolobular emphysema/effusions and pulmonary edema.    LABS:                        12.1   8.0   )-----------( 218      ( 2018 04:21 )             39.2     02-13    140  |  108<H>  |  24.0<H>  ----------------------------<  146<H>  4.4   |  22.0  |  0.99    Ca    8.3<L>      2018 05:02  Mg     3.1     02-13    TPro  5.9<L>  /  Alb  3.1<L>  /  TBili  0.4  /  DBili  x   /  AST  26  /  ALT  9   /  AlkPhos  66  02-13    CARDIAC MARKERS ( 2018 15:11 )  x     / 0.02 ng/mL / 89 U/L / x     / x      CARDIAC MARKERS ( 2018 09:13 )  x     / 0.03 ng/mL / 58 U/L / x     / x      CARDIAC MARKERS ( 2018 05:02 )  x     / 0.01 ng/mL / x     / x     / x          PT/INR - ( 2018 04:21 )   PT: 15.9 sec;   INR: 1.43 ratio         PTT - ( 2018 04:21 )  PTT:30.6 sec  Urinalysis Basic - ( 2018 13:53 )    Color: Yellow / Appearance: Clear / S.025 / pH: x  Gluc: x / Ketone: Negative  / Bili: Negative / Urobili: Negative mg/dL   Blood: x / Protein: 30 mg/dL / Nitrite: Negative   Leuk Esterase: Negative / RBC: 0-2 /HPF / WBC 0-2   Sq Epi: x / Non Sq Epi: Occasional / Bacteria: Occasional      I&O's Summary      RADIOLOGY & ADDITIONAL STUDIES:

## 2018-02-14 NOTE — PROGRESS NOTE ADULT - SUBJECTIVE AND OBJECTIVE BOX
is a pleasant 84y/o female who follows w/ as an outpatient. She presented w/acute pulmonary edema and responded to IV lasix. Her echo reveals a low EF of 30-35% and it reveals severe aortic regurgitation. For now, she's going to be undergoing diuresis, and she will need LCH and RHC by cardiology.    Summary:   REVIEW OF SYSTEMS    General:	"I'm feeling better."    Skin/Breast:  	  Ophthalmologic:  	  ENMT:	    Respiratory and Thorax:  	  Cardiovascular:	    Gastrointestinal:	    Genitourinary:	    Musculoskeletal:	    Neurological:	    Psychiatric:	    Hematology/Lymphatics:	    Endocrine:	    Allergic/Immunologic:	  Vital Signs Last 24 Hrs  T(C): 36.9 (14 Feb 2018 12:04), Max: 98.4 (13 Feb 2018 17:08)  T(F): 98.5 (14 Feb 2018 12:04), Max: 209.1 (13 Feb 2018 17:08)  HR: 73 (14 Feb 2018 12:56) (67 - 119)  BP: 129/67 (14 Feb 2018 12:04) (111/63 - 133/63)  BP(mean): --  RR: 18 (14 Feb 2018 12:04) (18 - 18)  SpO2: 99% (14 Feb 2018 12:56) (94% - 100%)  PHYSICAL EXAM:  GEN: mild distress, comfortable, speaking full sentences, awake, alert  HEENT: MMM  CVS: S1S2 RRR  PULM: good breath sounds, no crackles at the bases  ABD: soft, nontender, no rebound, no guarding  EXTREM: no edema                        11.1   10.3  )-----------( 193      ( 14 Feb 2018 08:50 )             35.9     02-14    138  |  100  |  21.0<H>  ----------------------------<  202<H>  4.4   |  25.0  |  0.79    Ca    8.5<L>      14 Feb 2018 08:50  Phos  3.6     02-14  Mg     2.0     02-14    TPro  5.9<L>  /  Alb  3.1<L>  /  TBili  0.4  /  DBili  x   /  AST  26  /  ALT  9   /  AlkPhos  66  02-13    LIVER FUNCTIONS - ( 13 Feb 2018 05:02 )  Alb: 3.1 g/dL / Pro: 5.9 g/dL / ALK PHOS: 66 U/L / ALT: 9 U/L / AST: 26 U/L / GGT: x           PT/INR - ( 13 Feb 2018 04:21 )   PT: 15.9 sec;   INR: 1.43 ratio         PTT - ( 13 Feb 2018 04:21 )  PTT:30.6 sec    Radiology:     MEDICATIONS  (STANDING):  ALBUTerol    90 MICROgram(s) HFA Inhaler 1 Puff(s) Inhalation every 4 hours  ALBUTerol/ipratropium for Nebulization 3 milliLiter(s) Nebulizer every 4 hours  amiodarone    Tablet 200 milliGRAM(s) Oral daily  apixaban 2.5 milliGRAM(s) Oral every 12 hours  aspirin enteric coated 81 milliGRAM(s) Oral daily  atorvastatin 40 milliGRAM(s) Oral at bedtime  azithromycin  IVPB 500 milliGRAM(s) IV Intermittent every 24 hours  cefTRIAXone   IVPB 1 Gram(s) IV Intermittent every 24 hours  dextrose 5%. 1000 milliLiter(s) (50 mL/Hr) IV Continuous <Continuous>  dextrose 50% Injectable 12.5 Gram(s) IV Push once  dextrose 50% Injectable 25 Gram(s) IV Push once  dextrose 50% Injectable 25 Gram(s) IV Push once  furosemide   Injectable 40 milliGRAM(s) IV Push daily  furosemide   Injectable 20 milliGRAM(s) IV Push two times a day  insulin lispro (HumaLOG) corrective regimen sliding scale   SubCutaneous three times a day before meals  insulin lispro (HumaLOG) corrective regimen sliding scale   SubCutaneous at bedtime  lactobacillus acidophilus 1 Tablet(s) Oral two times a day with meals  lisinopril 5 milliGRAM(s) Oral daily  methylPREDNISolone sodium succinate Injectable 40 milliGRAM(s) IV Push every 12 hours  metoprolol     tartrate 50 milliGRAM(s) Oral two times a day  oxybutynin XL 5 milliGRAM(s) Oral daily  tiotropium 18 MICROgram(s) Capsule 1 Capsule(s) Inhalation daily    MEDICATIONS  (PRN):  ALBUTerol    0.083% 2.5 milliGRAM(s) Nebulizer every 2 hours PRN Shortness of Breath and/or Wheezing  dextrose Gel 1 Dose(s) Oral once PRN Blood Glucose LESS THAN 70 milliGRAM(s)/deciliter  glucagon  Injectable 1 milliGRAM(s) IntraMuscular once PRN Glucose LESS THAN 70 milligrams/deciliter

## 2018-02-14 NOTE — PROGRESS NOTE ADULT - PROBLEM SELECTOR PLAN 1
-responded to IV lasix  -ECHO results appreciated  -cardiology consult appreciated  -needs LHC and RHC this admission

## 2018-02-15 LAB
ANION GAP SERPL CALC-SCNC: 14 MMOL/L — SIGNIFICANT CHANGE UP (ref 5–17)
BUN SERPL-MCNC: 24 MG/DL — HIGH (ref 8–20)
CALCIUM SERPL-MCNC: 8.8 MG/DL — SIGNIFICANT CHANGE UP (ref 8.6–10.2)
CHLORIDE SERPL-SCNC: 96 MMOL/L — LOW (ref 98–107)
CO2 SERPL-SCNC: 31 MMOL/L — HIGH (ref 22–29)
CREAT SERPL-MCNC: 0.87 MG/DL — SIGNIFICANT CHANGE UP (ref 0.5–1.3)
GLUCOSE BLDC GLUCOMTR-MCNC: 128 MG/DL — HIGH (ref 70–99)
GLUCOSE BLDC GLUCOMTR-MCNC: 171 MG/DL — HIGH (ref 70–99)
GLUCOSE BLDC GLUCOMTR-MCNC: 176 MG/DL — HIGH (ref 70–99)
GLUCOSE BLDC GLUCOMTR-MCNC: 178 MG/DL — HIGH (ref 70–99)
GLUCOSE BLDC GLUCOMTR-MCNC: 180 MG/DL — HIGH (ref 70–99)
GLUCOSE SERPL-MCNC: 138 MG/DL — HIGH (ref 70–115)
HCT VFR BLD CALC: 38.2 % — SIGNIFICANT CHANGE UP (ref 37–47)
HGB BLD-MCNC: 11.5 G/DL — LOW (ref 12–16)
MCHC RBC-ENTMCNC: 27 PG — SIGNIFICANT CHANGE UP (ref 27–31)
MCHC RBC-ENTMCNC: 30.1 G/DL — LOW (ref 32–36)
MCV RBC AUTO: 89.7 FL — SIGNIFICANT CHANGE UP (ref 81–99)
PLATELET # BLD AUTO: 210 K/UL — SIGNIFICANT CHANGE UP (ref 150–400)
POTASSIUM SERPL-MCNC: 3.8 MMOL/L — SIGNIFICANT CHANGE UP (ref 3.5–5.3)
POTASSIUM SERPL-SCNC: 3.8 MMOL/L — SIGNIFICANT CHANGE UP (ref 3.5–5.3)
RBC # BLD: 4.26 M/UL — LOW (ref 4.4–5.2)
RBC # FLD: 14.7 % — SIGNIFICANT CHANGE UP (ref 11–15.6)
SODIUM SERPL-SCNC: 141 MMOL/L — SIGNIFICANT CHANGE UP (ref 135–145)
WBC # BLD: 13.1 K/UL — HIGH (ref 4.8–10.8)
WBC # FLD AUTO: 13.1 K/UL — HIGH (ref 4.8–10.8)

## 2018-02-15 PROCEDURE — 99233 SBSQ HOSP IP/OBS HIGH 50: CPT

## 2018-02-15 RX ORDER — FUROSEMIDE 40 MG
40 TABLET ORAL DAILY
Qty: 0 | Refills: 0 | Status: DISCONTINUED | OUTPATIENT
Start: 2018-02-16 | End: 2018-02-17

## 2018-02-15 RX ADMIN — Medication 5 MILLIGRAM(S): at 11:34

## 2018-02-15 RX ADMIN — Medication 1 TABLET(S): at 17:53

## 2018-02-15 RX ADMIN — Medication 3 MILLILITER(S): at 04:27

## 2018-02-15 RX ADMIN — Medication 2: at 17:53

## 2018-02-15 RX ADMIN — AMIODARONE HYDROCHLORIDE 200 MILLIGRAM(S): 400 TABLET ORAL at 06:18

## 2018-02-15 RX ADMIN — Medication 2: at 09:04

## 2018-02-15 RX ADMIN — Medication 1 TABLET(S): at 09:02

## 2018-02-15 RX ADMIN — Medication 3 MILLILITER(S): at 15:23

## 2018-02-15 RX ADMIN — LISINOPRIL 5 MILLIGRAM(S): 2.5 TABLET ORAL at 06:19

## 2018-02-15 RX ADMIN — Medication 20 MILLIGRAM(S): at 06:19

## 2018-02-15 RX ADMIN — Medication 81 MILLIGRAM(S): at 11:34

## 2018-02-15 RX ADMIN — ATORVASTATIN CALCIUM 40 MILLIGRAM(S): 80 TABLET, FILM COATED ORAL at 21:33

## 2018-02-15 RX ADMIN — Medication 3 MILLILITER(S): at 12:28

## 2018-02-15 RX ADMIN — Medication 50 MILLIGRAM(S): at 06:21

## 2018-02-15 RX ADMIN — APIXABAN 2.5 MILLIGRAM(S): 2.5 TABLET, FILM COATED ORAL at 06:20

## 2018-02-15 RX ADMIN — CEFTRIAXONE 100 GRAM(S): 500 INJECTION, POWDER, FOR SOLUTION INTRAMUSCULAR; INTRAVENOUS at 17:52

## 2018-02-15 RX ADMIN — Medication 3 MILLILITER(S): at 20:52

## 2018-02-15 RX ADMIN — AZITHROMYCIN 255 MILLIGRAM(S): 500 TABLET, FILM COATED ORAL at 06:20

## 2018-02-15 RX ADMIN — Medication 3 MILLILITER(S): at 08:37

## 2018-02-15 RX ADMIN — Medication 3 MILLILITER(S): at 00:33

## 2018-02-15 RX ADMIN — Medication 40 MILLIGRAM(S): at 06:00

## 2018-02-15 RX ADMIN — Medication 40 MILLIGRAM(S): at 06:19

## 2018-02-15 NOTE — PROGRESS NOTE ADULT - SUBJECTIVE AND OBJECTIVE BOX
is a pleasant 82y/o female who follows w/ as an outpatient. She presented w/acute pulmonary edema and responded to IV lasix. Her echo reveals a low EF of 30-35% and it reveals severe aortic regurgitation. For now, she's going to be undergoing diuresis, and she will need LCH and RHC by cardiology.    She's doing well and reports feeling "comfortable."    Summary:   REVIEW OF SYSTEMS    General:	"I'm feeling better."    Skin/Breast:  	  Ophthalmologic:  	  ENMT:	    Respiratory and Thorax:  	  Cardiovascular:	    Gastrointestinal:	    Genitourinary:	    Musculoskeletal:	    Neurological:	    Psychiatric:	    Hematology/Lymphatics:	    Endocrine:	    Allergic/Immunologic:	  Vital Signs Last 24 Hrs  114/75, HR=86  PHYSICAL EXAM:  GEN: mild distress, comfortable, speaking full sentences, awake, alert  HEENT: MMM  CVS: S1S2 RRR  PULM: good breath sounds, no crackles at the bases  ABD: soft, nontender, no rebound, no guarding  EXTREM: no edema                     11.5   13.1  )-----------( 210      ( 15 Feb 2018 11:33 )             38.2     02-15    141  |  96<L>  |  24.0<H>  ----------------------------<  138<H>  3.8   |  31.0<H>  |  0.87    Ca    8.8      15 Feb 2018 11:33  Phos  3.6     02-14  Mg     2.0     02-14    Radiology:     MEDICATIONS  (STANDING):  ALBUTerol    90 MICROgram(s) HFA Inhaler 1 Puff(s) Inhalation every 4 hours  ALBUTerol/ipratropium for Nebulization 3 milliLiter(s) Nebulizer every 4 hours  amiodarone    Tablet 200 milliGRAM(s) Oral daily  apixaban 2.5 milliGRAM(s) Oral every 12 hours  aspirin enteric coated 81 milliGRAM(s) Oral daily  atorvastatin 40 milliGRAM(s) Oral at bedtime  azithromycin  IVPB 500 milliGRAM(s) IV Intermittent every 24 hours  cefTRIAXone   IVPB 1 Gram(s) IV Intermittent every 24 hours  dextrose 5%. 1000 milliLiter(s) (50 mL/Hr) IV Continuous <Continuous>  dextrose 50% Injectable 12.5 Gram(s) IV Push once  dextrose 50% Injectable 25 Gram(s) IV Push once  dextrose 50% Injectable 25 Gram(s) IV Push once  furosemide   Injectable 40 milliGRAM(s) IV Push daily  furosemide   Injectable 20 milliGRAM(s) IV Push two times a day  insulin lispro (HumaLOG) corrective regimen sliding scale   SubCutaneous three times a day before meals  insulin lispro (HumaLOG) corrective regimen sliding scale   SubCutaneous at bedtime  lactobacillus acidophilus 1 Tablet(s) Oral two times a day with meals  lisinopril 5 milliGRAM(s) Oral daily  metoprolol     tartrate 50 milliGRAM(s) Oral two times a day  oxybutynin XL 5 milliGRAM(s) Oral daily  tiotropium 18 MICROgram(s) Capsule 1 Capsule(s) Inhalation daily    MEDICATIONS  (PRN):  ALBUTerol    0.083% 2.5 milliGRAM(s) Nebulizer every 2 hours PRN Shortness of Breath and/or Wheezing  dextrose Gel 1 Dose(s) Oral once PRN Blood Glucose LESS THAN 70 milliGRAM(s)/deciliter  glucagon  Injectable 1 milliGRAM(s) IntraMuscular once PRN Glucose LESS THAN 70 milligrams/deciliter

## 2018-02-15 NOTE — PROGRESS NOTE ADULT - SUBJECTIVE AND OBJECTIVE BOX
[place] [facility] - [facility], THE HEART CENTER                                   [address] [place] [address]                                                      PHONE: [phone]                                                         FAX: [phone]  [email or web address]  ---------------------------------------------------------------------------------------------------------------------------------    Overnight events/patient complaints: No acute events overnight.     PAST MEDICAL & SURGICAL HISTORY:  Abdominal aortic aneurysm  Atrial fibrillation  Overactive bladder  High cholesterol  Diabetes  Hypertension  H/O foot surgery  H/O cataract extraction    No Known Allergies    MEDICATIONS  (STANDING):  ALBUTerol    90 MICROgram(s) HFA Inhaler 1 Puff(s) Inhalation every 4 hours  ALBUTerol/ipratropium for Nebulization 3 milliLiter(s) Nebulizer every 4 hours  amiodarone    Tablet 200 milliGRAM(s) Oral daily  apixaban 2.5 milliGRAM(s) Oral every 12 hours  aspirin enteric coated 81 milliGRAM(s) Oral daily  atorvastatin 40 milliGRAM(s) Oral at bedtime  azithromycin  IVPB 500 milliGRAM(s) IV Intermittent every 24 hours  cefTRIAXone   IVPB 1 Gram(s) IV Intermittent every 24 hours  dextrose 5%. 1000 milliLiter(s) (50 mL/Hr) IV Continuous <Continuous>  dextrose 50% Injectable 12.5 Gram(s) IV Push once  dextrose 50% Injectable 25 Gram(s) IV Push once  dextrose 50% Injectable 25 Gram(s) IV Push once  furosemide   Injectable 40 milliGRAM(s) IV Push daily  furosemide   Injectable 20 milliGRAM(s) IV Push two times a day  insulin lispro (HumaLOG) corrective regimen sliding scale   SubCutaneous three times a day before meals  insulin lispro (HumaLOG) corrective regimen sliding scale   SubCutaneous at bedtime  lactobacillus acidophilus 1 Tablet(s) Oral two times a day with meals  lisinopril 5 milliGRAM(s) Oral daily  metoprolol     tartrate 50 milliGRAM(s) Oral two times a day  oxybutynin XL 5 milliGRAM(s) Oral daily  tiotropium 18 MICROgram(s) Capsule 1 Capsule(s) Inhalation daily    MEDICATIONS  (PRN):  ALBUTerol    0.083% 2.5 milliGRAM(s) Nebulizer every 2 hours PRN Shortness of Breath and/or Wheezing  dextrose Gel 1 Dose(s) Oral once PRN Blood Glucose LESS THAN 70 milliGRAM(s)/deciliter  glucagon  Injectable 1 milliGRAM(s) IntraMuscular once PRN Glucose LESS THAN 70 milligrams/deciliter      Vital Signs Last 24 Hrs  T(C): 36.7 (15 Feb 2018 10:00), Max: 36.9 (2018 12:04)  T(F): 98.1 (15 Feb 2018 10:00), Max: 98.5 (2018 12:04)  HR: 86 (15 Feb 2018 10:00) (71 - 86)  BP: 114/75 (15 Feb 2018 10:00) (114/75 - 146/83)  BP(mean): --  RR: 16 (15 Feb 2018 10:00) (16 - 20)  SpO2: 96% (15 Feb 2018 10:00) (96% - 99%)  ICU Vital Signs Last 24 Hrs    PHYSICAL EXAM:  General: thin elderly woman in no acute distress  HEENT: Head; normocephalic, atraumatic. sclera anicteric, +HJR  CARDIOVASCULAR: irregularly irregualr, 2/6 NICK and 2/4 diastolic murmur, no rub, gallop or lift.   LUNGS: No rales, rhonchi or wheeze. Normal breath sounds bilaterally.  ABDOMEN: Soft, nontender without mass or organomegaly. bowel sounds normoactive.  EXTREMITIES: No clubbing, cyanosis, +LE edema   SKIN: warm and dry   NEURO: Alert/oriented x 3   PSYCH: normal affect.        LABS:                        11.1   10.3  )-----------( 193      ( 2018 08:50 )             35.9     02-14    138  |  100  |  21.0<H>  ----------------------------<  202<H>  4.4   |  25.0  |  0.79    Ca    8.5<L>      2018 08:50  Phos  3.6     -  Mg     2.0           ELAN MOSS  CARDIAC MARKERS ( 2018 15:11 )  x     / 0.02 ng/mL / 89 U/L / x     / x        Urinalysis Basic - ( 2018 13:53 )    Color: Yellow / Appearance: Clear / S.025 / pH: x  Gluc: x / Ketone: Negative  / Bili: Negative / Urobili: Negative mg/dL   Blood: x / Protein: 30 mg/dL / Nitrite: Negative   Leuk Esterase: Negative / RBC: 0-2 /HPF / WBC 0-2   Sq Epi: x / Non Sq Epi: Occasional / Bacteria: Occasional    RADIOLOGY & ADDITIONAL STUDIES:    INTERPRETATION OF TELEMETRY (personally reviewed):    ECG: < from: 12 Lead ECG (18 @ 03:40) >  Sinus rhythm with sinus arrhythmia with occasional Premature ventricular complexes  Minimal voltage criteria for LVH, may be normal variant  Nonspecific ST and T wave abnormality    ECHO:  < from: TTE Echo Complete w/Doppler (18 @ 11:17) >   1. Severely decreased global left ventricular systolic function.   2. Left ventricular ejection fraction, by visual estimation, is 30 to 35%.   3. Spectral Doppler shows pseudonormal pattern of left ventricular myocardial filling (Grade II diastolic dysfunction).   4. Mild RV systolic dysfunction.   5. Severe left and right atrial enlargement.   6. Moderate to severe aortic regurgitation.   7. Thickening and calcification of the anterior and posterior mitral valve leaflets.   8. Moderate mitral valve regurgitation.   9.Mild mitral annular calcification.  10. Moderate tricuspid regurgitation.  11. Estimated pulmonary artery systolic pressure is 48.2 mmHg assuming a right atrial pressure of 15 mmHg, which is consistent with mild pulmonary hypertension.  12. Endocardial visualization was enhanced with intravenous echo contrast.  13. Moderate pleural effusion in both left and right lateral regions.  14. Small plaque involving the ascending aorta.  15. There is no evidence of pericardial effusion.    ASSESSMENT AND PLAN:  In summary, ELAN MOSS is an 83y Female with past medical history significant for HTN, HLD, moderate AR, paroxsymal atrial fibrillation and CVA without residual deficit who presents with pulmonary edema found to have new severely reduced LVEF and progression of her aortic regurgitation     Acute decompensated HFrEF  NYHA class III, AHA stage C; etiology likely multifactorial including progressive valvulopathy and possible CAD   - continue diuresis with lasix IV  - close monitoring of electrolytes and renal function  - transition to metoprolol succinate 100 and continue lisinopril   - will plan for LHC/RHC 18     Moderate to severe AR  - Patient with dilated LV (LVIDd) and EF 30-35; will need evaluation for AVR (class I indication)  - LHC/RHC as above     Paroxsymal atrial fibrillation with hx CVA   - continue apixiban   - BB and amiodarone for rate/rhythm control     Thank you for allowing [facility] to participate in the care of this patient.  Please feel free to call with any questions or concerns. [place] [facility] - [facility], THE HEART CENTER                                   [address] [place] [address]                                                      PHONE: [phone]                                                         FAX: [phone]  [email or web address]  ---------------------------------------------------------------------------------------------------------------------------------    Overnight events/patient complaints: No acute events overnight.     PAST MEDICAL & SURGICAL HISTORY:  Abdominal aortic aneurysm  Atrial fibrillation  Overactive bladder  High cholesterol  Diabetes  Hypertension  H/O foot surgery  H/O cataract extraction    No Known Allergies    MEDICATIONS  (STANDING):  ALBUTerol    90 MICROgram(s) HFA Inhaler 1 Puff(s) Inhalation every 4 hours  ALBUTerol/ipratropium for Nebulization 3 milliLiter(s) Nebulizer every 4 hours  amiodarone    Tablet 200 milliGRAM(s) Oral daily  apixaban 2.5 milliGRAM(s) Oral every 12 hours  aspirin enteric coated 81 milliGRAM(s) Oral daily  atorvastatin 40 milliGRAM(s) Oral at bedtime  azithromycin  IVPB 500 milliGRAM(s) IV Intermittent every 24 hours  cefTRIAXone   IVPB 1 Gram(s) IV Intermittent every 24 hours  dextrose 5%. 1000 milliLiter(s) (50 mL/Hr) IV Continuous <Continuous>  dextrose 50% Injectable 12.5 Gram(s) IV Push once  dextrose 50% Injectable 25 Gram(s) IV Push once  dextrose 50% Injectable 25 Gram(s) IV Push once  furosemide   Injectable 40 milliGRAM(s) IV Push daily  furosemide   Injectable 20 milliGRAM(s) IV Push two times a day  insulin lispro (HumaLOG) corrective regimen sliding scale   SubCutaneous three times a day before meals  insulin lispro (HumaLOG) corrective regimen sliding scale   SubCutaneous at bedtime  lactobacillus acidophilus 1 Tablet(s) Oral two times a day with meals  lisinopril 5 milliGRAM(s) Oral daily  metoprolol     tartrate 50 milliGRAM(s) Oral two times a day  oxybutynin XL 5 milliGRAM(s) Oral daily  tiotropium 18 MICROgram(s) Capsule 1 Capsule(s) Inhalation daily    MEDICATIONS  (PRN):  ALBUTerol    0.083% 2.5 milliGRAM(s) Nebulizer every 2 hours PRN Shortness of Breath and/or Wheezing  dextrose Gel 1 Dose(s) Oral once PRN Blood Glucose LESS THAN 70 milliGRAM(s)/deciliter  glucagon  Injectable 1 milliGRAM(s) IntraMuscular once PRN Glucose LESS THAN 70 milligrams/deciliter      Vital Signs Last 24 Hrs  T(C): 36.7 (15 Feb 2018 10:00), Max: 36.9 (2018 12:04)  T(F): 98.1 (15 Feb 2018 10:00), Max: 98.5 (2018 12:04)  HR: 86 (15 Feb 2018 10:00) (71 - 86)  BP: 114/75 (15 Feb 2018 10:00) (114/75 - 146/83)  BP(mean): --  RR: 16 (15 Feb 2018 10:00) (16 - 20)  SpO2: 96% (15 Feb 2018 10:00) (96% - 99%)  ICU Vital Signs Last 24 Hrs    PHYSICAL EXAM:  General: elderly woman in no acute distress  HEENT: Head; normocephalic, atraumatic. sclera anicteric, +HJR  CARDIOVASCULAR: irregularly irregualr, 2/6 NICK and 2/4 diastolic murmur, no rub, gallop or lift.   LUNGS: No rales, rhonchi or wheeze. Normal breath sounds bilaterally.  ABDOMEN: Soft, nontender without mass or organomegaly. bowel sounds normoactive.  EXTREMITIES: No clubbing, cyanosis, +LE edema   SKIN: warm and dry   NEURO: Alert/oriented x 3   PSYCH: normal affect.        LABS:                        11.1   10.3  )-----------( 193      ( 2018 08:50 )             35.9     02-14    138  |  100  |  21.0<H>  ----------------------------<  202<H>  4.4   |  25.0  |  0.79    Ca    8.5<L>      2018 08:50  Phos  3.6     -  Mg     2.0     -      ELAN MOSS  CARDIAC MARKERS ( 2018 15:11 )  x     / 0.02 ng/mL / 89 U/L / x     / x        Urinalysis Basic - ( 2018 13:53 )    Color: Yellow / Appearance: Clear / S.025 / pH: x  Gluc: x / Ketone: Negative  / Bili: Negative / Urobili: Negative mg/dL   Blood: x / Protein: 30 mg/dL / Nitrite: Negative   Leuk Esterase: Negative / RBC: 0-2 /HPF / WBC 0-2   Sq Epi: x / Non Sq Epi: Occasional / Bacteria: Occasional    RADIOLOGY & ADDITIONAL STUDIES:    INTERPRETATION OF TELEMETRY (personally reviewed):    ECG: < from: 12 Lead ECG (18 @ 03:40) >  Sinus rhythm with sinus arrhythmia with occasional Premature ventricular complexes  Minimal voltage criteria for LVH, may be normal variant  Nonspecific ST and T wave abnormality    ECHO:  < from: TTE Echo Complete w/Doppler (18 @ 11:17) >   1. Severely decreased global left ventricular systolic function.   2. Left ventricular ejection fraction, by visual estimation, is 30 to 35%.   3. Spectral Doppler shows pseudonormal pattern of left ventricular myocardial filling (Grade II diastolic dysfunction).   4. Mild RV systolic dysfunction.   5. Severe left and right atrial enlargement.   6. Moderate to severe aortic regurgitation.   7. Thickening and calcification of the anterior and posterior mitral valve leaflets.   8. Moderate mitral valve regurgitation.   9.Mild mitral annular calcification.  10. Moderate tricuspid regurgitation.  11. Estimated pulmonary artery systolic pressure is 48.2 mmHg assuming a right atrial pressure of 15 mmHg, which is consistent with mild pulmonary hypertension.  12. Endocardial visualization was enhanced with intravenous echo contrast.  13. Moderate pleural effusion in both left and right lateral regions.  14. Small plaque involving the ascending aorta.  15. There is no evidence of pericardial effusion.    ASSESSMENT AND PLAN:  In summary, ELAN MOSS is an 83y Female with past medical history significant for HTN, HLD, moderate AR, paroxsymal atrial fibrillation and CVA without residual deficit who presents with pulmonary edema found to have new severely reduced LVEF and progression of her aortic regurgitation     Acute decompensated HFrEF  NYHA class III, AHA stage C; etiology likely multifactorial including progressive valvulopathy and possible CAD   - continue diuresis with lasix IV  - close monitoring of electrolytes and renal function  - transition to metoprolol succinate 100 and continue lisinopril   - will plan for LHC/RHC 18 and surgical consult post catheterization   - hold eliquis evening dose in anticipation of LHC 18    Moderate to severe AR  - Patient with dilated LV (LVIDd) and EF 30-35; will need evaluation for AVR (class I indication)  - LHC/RHC as above     Paroxsymal atrial fibrillation with hx CVA   - continue apixiban   - BB and amiodarone for rate/rhythm control     Further cardiovascular recommendations pending angiography results     Thank you for allowing [facility] to participate in the care of this patient.  Please feel free to call with any questions or concerns.

## 2018-02-15 NOTE — PROGRESS NOTE ADULT - PROBLEM SELECTOR PLAN 1
-responded to IV lasix  -ECHO results appreciated, lower EF compared to previous ECHO; also has severe AI  -cardiology consult appreciated  -needs LHC and RHC this admission  -NPO after midnight tonight?

## 2018-02-16 ENCOUNTER — TRANSCRIPTION ENCOUNTER (OUTPATIENT)
Age: 83
End: 2018-02-16

## 2018-02-16 LAB
ANION GAP SERPL CALC-SCNC: 12 MMOL/L — SIGNIFICANT CHANGE UP (ref 5–17)
BUN SERPL-MCNC: 29 MG/DL — HIGH (ref 8–20)
CALCIUM SERPL-MCNC: 8.6 MG/DL — SIGNIFICANT CHANGE UP (ref 8.6–10.2)
CHLORIDE SERPL-SCNC: 100 MMOL/L — SIGNIFICANT CHANGE UP (ref 98–107)
CO2 SERPL-SCNC: 30 MMOL/L — HIGH (ref 22–29)
CREAT SERPL-MCNC: 0.86 MG/DL — SIGNIFICANT CHANGE UP (ref 0.5–1.3)
GLUCOSE BLDC GLUCOMTR-MCNC: 141 MG/DL — HIGH (ref 70–99)
GLUCOSE BLDC GLUCOMTR-MCNC: 148 MG/DL — HIGH (ref 70–99)
GLUCOSE BLDC GLUCOMTR-MCNC: 89 MG/DL — SIGNIFICANT CHANGE UP (ref 70–99)
GLUCOSE SERPL-MCNC: 106 MG/DL — SIGNIFICANT CHANGE UP (ref 70–115)
HCT VFR BLD CALC: 34.3 % — LOW (ref 37–47)
HGB BLD-MCNC: 10.6 G/DL — LOW (ref 12–16)
MAGNESIUM SERPL-MCNC: 2.1 MG/DL — SIGNIFICANT CHANGE UP (ref 1.8–2.6)
MCHC RBC-ENTMCNC: 27.2 PG — SIGNIFICANT CHANGE UP (ref 27–31)
MCHC RBC-ENTMCNC: 30.9 G/DL — LOW (ref 32–36)
MCV RBC AUTO: 88.2 FL — SIGNIFICANT CHANGE UP (ref 81–99)
PHOSPHATE SERPL-MCNC: 3.5 MG/DL — SIGNIFICANT CHANGE UP (ref 2.4–4.7)
PLATELET # BLD AUTO: 186 K/UL — SIGNIFICANT CHANGE UP (ref 150–400)
POTASSIUM SERPL-MCNC: 4 MMOL/L — SIGNIFICANT CHANGE UP (ref 3.5–5.3)
POTASSIUM SERPL-SCNC: 4 MMOL/L — SIGNIFICANT CHANGE UP (ref 3.5–5.3)
RBC # BLD: 3.89 M/UL — LOW (ref 4.4–5.2)
RBC # FLD: 14.6 % — SIGNIFICANT CHANGE UP (ref 11–15.6)
SODIUM SERPL-SCNC: 142 MMOL/L — SIGNIFICANT CHANGE UP (ref 135–145)
WBC # BLD: 10.4 K/UL — SIGNIFICANT CHANGE UP (ref 4.8–10.8)
WBC # FLD AUTO: 10.4 K/UL — SIGNIFICANT CHANGE UP (ref 4.8–10.8)

## 2018-02-16 PROCEDURE — 99233 SBSQ HOSP IP/OBS HIGH 50: CPT

## 2018-02-16 RX ORDER — APIXABAN 2.5 MG/1
2.5 TABLET, FILM COATED ORAL EVERY 12 HOURS
Qty: 0 | Refills: 0 | Status: DISCONTINUED | OUTPATIENT
Start: 2018-02-16 | End: 2018-02-16

## 2018-02-16 RX ORDER — IPRATROPIUM/ALBUTEROL SULFATE 18-103MCG
3 AEROSOL WITH ADAPTER (GRAM) INHALATION
Qty: 0 | Refills: 0 | COMMUNITY
Start: 2018-02-16

## 2018-02-16 RX ORDER — METOPROLOL TARTRATE 50 MG
1 TABLET ORAL
Qty: 30 | Refills: 0 | OUTPATIENT
Start: 2018-02-16 | End: 2018-03-17

## 2018-02-16 RX ORDER — LISINOPRIL 2.5 MG/1
1 TABLET ORAL
Qty: 30 | Refills: 0 | OUTPATIENT
Start: 2018-02-16 | End: 2018-03-17

## 2018-02-16 RX ORDER — FUROSEMIDE 40 MG
1 TABLET ORAL
Qty: 30 | Refills: 0 | OUTPATIENT
Start: 2018-02-16 | End: 2018-03-17

## 2018-02-16 RX ORDER — LACTOBACILLUS ACIDOPHILUS 100MM CELL
1 CAPSULE ORAL
Qty: 60 | Refills: 0
Start: 2018-02-16 | End: 2018-03-17

## 2018-02-16 RX ORDER — TIOTROPIUM BROMIDE 18 UG/1
1 CAPSULE ORAL; RESPIRATORY (INHALATION)
Qty: 0 | Refills: 0 | COMMUNITY
Start: 2018-02-16

## 2018-02-16 RX ADMIN — AMIODARONE HYDROCHLORIDE 200 MILLIGRAM(S): 400 TABLET ORAL at 05:09

## 2018-02-16 RX ADMIN — Medication 1 TABLET(S): at 17:45

## 2018-02-16 RX ADMIN — Medication 3 MILLILITER(S): at 20:18

## 2018-02-16 RX ADMIN — ATORVASTATIN CALCIUM 40 MILLIGRAM(S): 80 TABLET, FILM COATED ORAL at 23:47

## 2018-02-16 RX ADMIN — Medication 81 MILLIGRAM(S): at 08:04

## 2018-02-16 RX ADMIN — Medication 40 MILLIGRAM(S): at 05:09

## 2018-02-16 RX ADMIN — AZITHROMYCIN 255 MILLIGRAM(S): 500 TABLET, FILM COATED ORAL at 05:10

## 2018-02-16 RX ADMIN — Medication 3 MILLILITER(S): at 15:39

## 2018-02-16 RX ADMIN — Medication 5 MILLIGRAM(S): at 17:45

## 2018-02-16 RX ADMIN — Medication 3 MILLILITER(S): at 03:59

## 2018-02-16 RX ADMIN — Medication 3 MILLILITER(S): at 00:26

## 2018-02-16 RX ADMIN — Medication 50 MILLIGRAM(S): at 05:09

## 2018-02-16 RX ADMIN — CEFTRIAXONE 100 GRAM(S): 500 INJECTION, POWDER, FOR SOLUTION INTRAMUSCULAR; INTRAVENOUS at 17:45

## 2018-02-16 RX ADMIN — LISINOPRIL 5 MILLIGRAM(S): 2.5 TABLET ORAL at 05:09

## 2018-02-16 NOTE — DISCHARGE NOTE ADULT - MEDICATION SUMMARY - MEDICATIONS TO TAKE
I will START or STAY ON the medications listed below when I get home from the hospital:    Aspirin Low Dose 81 mg oral delayed release tablet  -- 1 tab(s) by mouth once a day  -- Indication: For Cardiac prohylaxis    lisinopril 5 mg oral tablet  -- 1 tab(s) by mouth once a day MDD:1 tab  -- Indication: For htn    amiodarone 200 mg oral tablet  -- 1 tab(s) by mouth once a day  -- Avoid grapefruit and grapefruit juice while taking this medication.  Avoid prolonged or excessive exposure to direct and/or artificial sunlight while taking this medication.  Do not take this drug if you are pregnant.  It is very important that you take or use this exactly as directed.  Do not skip doses or discontinue unless directed by your doctor.  May cause drowsiness or dizziness.    -- Indication: For Acute systolic heart failure    apixaban 5 mg oral tablet  -- 1 tab(s) by mouth 2 times a day  -- Indication: For Acute systolic heart failure    metFORMIN 500 mg oral tablet  -- 1 tab(s) by mouth once a day  -- Indication: For diabetes    atorvastatin 40 mg oral tablet  -- 1 tab(s) by mouth once a day  -- Indication: For hyperlipidemia    Toprol- mg oral tablet, extended release  -- 1 tab(s) by mouth once a day MDD:1 tab  -- It is very important that you take or use this exactly as directed.  Do not skip doses or discontinue unless directed by your doctor.  May cause drowsiness.  Alcohol may intensify this effect.  Use care when operating dangerous machinery.  Some non-prescription drugs may aggravate your condition.  Read all labels carefully.  If a warning appears, check with your doctor before taking.  Swallow whole.  Do not crush.  Take with food or milk.  This drug may impair the ability to drive or operate machinery.  Use care until you become familiar with its effects.    -- Indication: For Acute systolic heart failure    ipratropium-albuterol 0.5 mg-2.5 mg/3 mLinhalation solution  -- 3 milliliter(s) inhaled every 4 hours  -- Indication: For bronchospasm    tiotropium 18 mcg inhalation capsule  -- 1 cap(s) inhaled once a day  -- Indication: For bronchospasm    furosemide 40 mg oral tablet  -- 1 tab(s) by mouth once a day MDD:1 tab  -- Indication: For Acute systolic heart failure    lactobacillus acidophilus oral capsule  -- 1 tab(s) by mouth 2 times a day MDD:2 tabs  -- Indication: For supplementation    oxybutynin 5 mg oral tablet  -- 1 tab(s) by mouth once a day  -- Indication: For bladder spasms    PreserVision oral tablet  -- 1 tab(s) by mouth 2 times a day  -- Indication: For supplementation    Metanx oral tablet  -- 1 tab(s) by mouth 2 times a day  -- Indication: For supplementation I will START or STAY ON the medications listed below when I get home from the hospital:    Aspirin Low Dose 81 mg oral delayed release tablet  -- 1 tab(s) by mouth once a day  -- Indication: For Cardiac prohylaxis    lisinopril 5 mg oral tablet  -- 1 tab(s) by mouth once a day MDD:1 tab  -- Indication: For htn    amiodarone 200 mg oral tablet  -- 1 tab(s) by mouth once a day  -- Avoid grapefruit and grapefruit juice while taking this medication.  Avoid prolonged or excessive exposure to direct and/or artificial sunlight while taking this medication.  Do not take this drug if you are pregnant.  It is very important that you take or use this exactly as directed.  Do not skip doses or discontinue unless directed by your doctor.  May cause drowsiness or dizziness.    -- Indication: For Acute systolic heart failure    apixaban 5 mg oral tablet  -- 1 tab(s) by mouth 2 times a day  -- Indication: For Acute systolic heart failure    metFORMIN 500 mg oral tablet  -- 1 tab(s) by mouth once a day  -- Indication: For diabetes    atorvastatin 40 mg oral tablet  -- 1 tab(s) by mouth once a day  -- Indication: For hyperlipidemia    Toprol- mg oral tablet, extended release  -- 1 tab(s) by mouth once a day MDD:1 tab  -- It is very important that you take or use this exactly as directed.  Do not skip doses or discontinue unless directed by your doctor.  May cause drowsiness.  Alcohol may intensify this effect.  Use care when operating dangerous machinery.  Some non-prescription drugs may aggravate your condition.  Read all labels carefully.  If a warning appears, check with your doctor before taking.  Swallow whole.  Do not crush.  Take with food or milk.  This drug may impair the ability to drive or operate machinery.  Use care until you become familiar with its effects.    -- Indication: For Atrial fibrillation    ipratropium-albuterol 0.5 mg-2.5 mg/3 mLinhalation solution  -- 3 milliliter(s) inhaled every 4 hours  -- Indication: For bronchospasm    tiotropium 18 mcg inhalation capsule  -- 1 cap(s) inhaled once a day  -- Indication: For bronchospasm    furosemide 40 mg oral tablet  -- 1 tab(s) by mouth once a day MDD:1 tab  -- Indication: For Acute systolic heart failure    lactobacillus acidophilus oral capsule  -- 1 tab(s) by mouth 2 times a day MDD:2 tabs  -- Indication: For supplementation    oxybutynin 5 mg oral tablet  -- 1 tab(s) by mouth once a day  -- Indication: For bladder spasms    PreserVision oral tablet  -- 1 tab(s) by mouth 2 times a day  -- Indication: For supplementation    Metanx oral tablet  -- 1 tab(s) by mouth 2 times a day  -- Indication: For supplementation I will START or STAY ON the medications listed below when I get home from the hospital:    Aspirin Low Dose 81 mg oral delayed release tablet  -- 1 tab(s) by mouth once a day  -- Indication: For Cardiac prohylaxis    lisinopril 5 mg oral tablet  -- 1 tab(s) by mouth once a day MDD:1 tab  -- Indication: For htn    amiodarone 200 mg oral tablet  -- 1 tab(s) by mouth once a day  -- Avoid grapefruit and grapefruit juice while taking this medication.  Avoid prolonged or excessive exposure to direct and/or artificial sunlight while taking this medication.  Do not take this drug if you are pregnant.  It is very important that you take or use this exactly as directed.  Do not skip doses or discontinue unless directed by your doctor.  May cause drowsiness or dizziness.    -- Indication: For Acute systolic heart failure    apixaban 5 mg oral tablet  -- 1 tab(s) by mouth 2 times a day  -- Indication: For Acute systolic heart failure    metFORMIN 500 mg oral tablet  -- 1 tab(s) by mouth once a day  -- Indication: For diabetes    atorvastatin 40 mg oral tablet  -- 1 tab(s) by mouth once a day  -- Indication: For hyperlipidemia    Toprol- mg oral tablet, extended release  -- 1 tab(s) by mouth once a day MDD:1 tab  -- It is very important that you take or use this exactly as directed.  Do not skip doses or discontinue unless directed by your doctor.  May cause drowsiness.  Alcohol may intensify this effect.  Use care when operating dangerous machinery.  Some non-prescription drugs may aggravate your condition.  Read all labels carefully.  If a warning appears, check with your doctor before taking.  Swallow whole.  Do not crush.  Take with food or milk.  This drug may impair the ability to drive or operate machinery.  Use care until you become familiar with its effects.    -- Indication: For Atrial fibrillation    ipratropium-albuterol 0.5 mg-2.5 mg/3 mLinhalation solution  -- 3 milliliter(s) inhaled every 4 hours  -- Indication: For bronchospasm    tiotropium 18 mcg inhalation capsule  -- 1 cap(s) inhaled once a day  -- Indication: For bronchospasm    Lasix 20 mg oral tablet  -- 1 tab(s) by mouth once a day MDD:1 tab  -- Avoid prolonged or excessive exposure to direct and/or artificial sunlight while taking this medication.  It is very important that you take or use this exactly as directed.  Do not skip doses or discontinue unless directed by your doctor.  It may be advisable to drink a full glass orange juice or eat a banana daily while taking this medication.    -- Indication: For Aortic valve regurgitation    lactobacillus acidophilus oral capsule  -- 1 tab(s) by mouth 2 times a day MDD:2 tabs  -- Indication: For supplementation    oxybutynin 5 mg oral tablet  -- 1 tab(s) by mouth once a day  -- Indication: For bladder spasms    PreserVision oral tablet  -- 1 tab(s) by mouth 2 times a day  -- Indication: For supplementation    Metanx oral tablet  -- 1 tab(s) by mouth 2 times a day  -- Indication: For supplementation

## 2018-02-16 NOTE — DISCHARGE NOTE ADULT - PATIENT PORTAL LINK FT
You can access the [facility], offered by [facility], by registering with the following website: [email or web address]

## 2018-02-16 NOTE — PROGRESS NOTE ADULT - SUBJECTIVE AND OBJECTIVE BOX
83y Female who had a C which showed normal coronary arteries, RFA #5fr, RFV #7fr sheath removed by RN without incident. Patient awake and alert without complaints.    < from: Cardiac Cath Lab - Adult (02.16.18 @ 11:05) >  NTERVENTIONAL IMPRESSIONS: Moderate pulmonary HTN.  [id number].  Moderate LV dysfunction.  Nonobstructive CAD.  INTERVENTIONAL RECOMMENDATIONS: ACE/diuretics.  Outpt Cardiomems.  Prepared and signed by  Ulises Cobb MD  Signed 02/16/2018 11:38:07  HEMODYNAMIC TABLES  Pressures:  Baseline    < end of copied text >      Neuro: A&OX3  Lungs: CTA B/L  CV: S1, S2, no murmur, RRR  Abd: Soft  Right Groin: Soft, no bleeding, no hematoma  Extremity: + distal pulses      A/P: 83y Female s/p [facility] no intervention  1. Groin management discussed with patient  2. Continue current meds  3. Follow up as an outpatient with cardiologist  4. Resume Eliquis in 48 hours as per Dr. Cobb  5. Bedrest x 3 hours  6. Cardiomems as outpatient

## 2018-02-16 NOTE — DISCHARGE NOTE ADULT - CARE PROVIDER_API CALL
Dar Carroll), Cardiovascular Disease; Internal Medicine  [address] [place] [address]  Phone: [phone]  Fax: [phone]

## 2018-02-16 NOTE — DISCHARGE NOTE ADULT - CARE PLAN
Goal:	Optimal cardiac function  Assessment and plan of treatment:	No heavy lifting, driving, sex, tub baths, swimming, or any activity that submerges the lower half of the body in water for 48 hours.  Limited walking and stairs for 48 hours.    Change the bandaid after 24 hours and every 24 hours after that.  Keep the puncture site dry and covered with a bandaid until a scab forms.    Observe the site frequently.  If bleeding or a large lump (the size of a golf ball or bigger) occurs lie flat, apply continuous direct pressure just above the puncture site for at least 10 minutes, and notify your physician immediately.  If the bleeding cannot be controlled, call 911 immediately for assistance.  Notify your physician of pain, swelling or any drainage.    Notify your physician immediately if coldness, numbness, discoloration or pain in your foot occurs. Principal Discharge DX:	Acute systolic heart failure  Goal:	Optimal cardiac function  Assessment and plan of treatment:	No heavy lifting, driving, sex, tub baths, swimming, or any activity that submerges the lower half of the body in water for 48 hours.  Limited walking and stairs for 48 hours.    Change the bandaid after 24 hours and every 24 hours after that.  Keep the puncture site dry and covered with a bandaid until a scab forms.    Observe the site frequently.  If bleeding or a large lump (the size of a golf ball or bigger) occurs lie flat, apply continuous direct pressure just above the puncture site for at least 10 minutes, and notify your physician immediately.  If the bleeding cannot be controlled, call 911 immediately for assistance.  Notify your physician of pain, swelling or any drainage.    Notify your physician immediately if coldness, numbness, discoloration or pain in your foot occurs.  Secondary Diagnosis:	Chronic atrial fibrillation

## 2018-02-17 VITALS
RESPIRATION RATE: 18 BRPM | HEART RATE: 98 BPM | SYSTOLIC BLOOD PRESSURE: 108 MMHG | DIASTOLIC BLOOD PRESSURE: 68 MMHG | TEMPERATURE: 98 F | OXYGEN SATURATION: 100 %

## 2018-02-17 LAB
ANION GAP SERPL CALC-SCNC: 15 MMOL/L — SIGNIFICANT CHANGE UP (ref 5–17)
BUN SERPL-MCNC: 31 MG/DL — HIGH (ref 8–20)
CALCIUM SERPL-MCNC: 8.8 MG/DL — SIGNIFICANT CHANGE UP (ref 8.6–10.2)
CHLORIDE SERPL-SCNC: 97 MMOL/L — LOW (ref 98–107)
CO2 SERPL-SCNC: 32 MMOL/L — HIGH (ref 22–29)
CREAT SERPL-MCNC: 0.91 MG/DL — SIGNIFICANT CHANGE UP (ref 0.5–1.3)
GLUCOSE BLDC GLUCOMTR-MCNC: 137 MG/DL — HIGH (ref 70–99)
GLUCOSE SERPL-MCNC: 140 MG/DL — HIGH (ref 70–115)
HCT VFR BLD CALC: 40.2 % — SIGNIFICANT CHANGE UP (ref 37–47)
HGB BLD-MCNC: 12.3 G/DL — SIGNIFICANT CHANGE UP (ref 12–16)
MAGNESIUM SERPL-MCNC: 2.2 MG/DL — SIGNIFICANT CHANGE UP (ref 1.6–2.6)
MCHC RBC-ENTMCNC: 27.2 PG — SIGNIFICANT CHANGE UP (ref 27–31)
MCHC RBC-ENTMCNC: 30.6 G/DL — LOW (ref 32–36)
MCV RBC AUTO: 88.7 FL — SIGNIFICANT CHANGE UP (ref 81–99)
PHOSPHATE SERPL-MCNC: 4.3 MG/DL — SIGNIFICANT CHANGE UP (ref 2.4–4.7)
PLATELET # BLD AUTO: 216 K/UL — SIGNIFICANT CHANGE UP (ref 150–400)
POTASSIUM SERPL-MCNC: 3.9 MMOL/L — SIGNIFICANT CHANGE UP (ref 3.5–5.3)
POTASSIUM SERPL-SCNC: 3.9 MMOL/L — SIGNIFICANT CHANGE UP (ref 3.5–5.3)
RBC # BLD: 4.53 M/UL — SIGNIFICANT CHANGE UP (ref 4.4–5.2)
RBC # FLD: 14.5 % — SIGNIFICANT CHANGE UP (ref 11–15.6)
SODIUM SERPL-SCNC: 144 MMOL/L — SIGNIFICANT CHANGE UP (ref 135–145)
WBC # BLD: 10.5 K/UL — SIGNIFICANT CHANGE UP (ref 4.8–10.8)
WBC # FLD AUTO: 10.5 K/UL — SIGNIFICANT CHANGE UP (ref 4.8–10.8)

## 2018-02-17 PROCEDURE — 84100 ASSAY OF PHOSPHORUS: CPT

## 2018-02-17 PROCEDURE — 82550 ASSAY OF CK (CPK): CPT

## 2018-02-17 PROCEDURE — 94760 N-INVAS EAR/PLS OXIMETRY 1: CPT

## 2018-02-17 PROCEDURE — 83880 ASSAY OF NATRIURETIC PEPTIDE: CPT

## 2018-02-17 PROCEDURE — 36415 COLL VENOUS BLD VENIPUNCTURE: CPT

## 2018-02-17 PROCEDURE — 80053 COMPREHEN METABOLIC PANEL: CPT

## 2018-02-17 PROCEDURE — 96374 THER/PROPH/DIAG INJ IV PUSH: CPT

## 2018-02-17 PROCEDURE — 85027 COMPLETE CBC AUTOMATED: CPT

## 2018-02-17 PROCEDURE — C1894: CPT

## 2018-02-17 PROCEDURE — 85610 PROTHROMBIN TIME: CPT

## 2018-02-17 PROCEDURE — 84443 ASSAY THYROID STIM HORMONE: CPT

## 2018-02-17 PROCEDURE — 87086 URINE CULTURE/COLONY COUNT: CPT

## 2018-02-17 PROCEDURE — 71250 CT THORAX DX C-: CPT

## 2018-02-17 PROCEDURE — 71045 X-RAY EXAM CHEST 1 VIEW: CPT

## 2018-02-17 PROCEDURE — 99152 MOD SED SAME PHYS/QHP 5/>YRS: CPT

## 2018-02-17 PROCEDURE — 87633 RESP VIRUS 12-25 TARGETS: CPT

## 2018-02-17 PROCEDURE — C1769: CPT

## 2018-02-17 PROCEDURE — 81001 URINALYSIS AUTO W/SCOPE: CPT

## 2018-02-17 PROCEDURE — 93005 ELECTROCARDIOGRAM TRACING: CPT

## 2018-02-17 PROCEDURE — 94640 AIRWAY INHALATION TREATMENT: CPT

## 2018-02-17 PROCEDURE — C1887: CPT

## 2018-02-17 PROCEDURE — 83735 ASSAY OF MAGNESIUM: CPT

## 2018-02-17 PROCEDURE — 84484 ASSAY OF TROPONIN QUANT: CPT

## 2018-02-17 PROCEDURE — 93306 TTE W/DOPPLER COMPLETE: CPT

## 2018-02-17 PROCEDURE — 87798 DETECT AGENT NOS DNA AMP: CPT

## 2018-02-17 PROCEDURE — 82962 GLUCOSE BLOOD TEST: CPT

## 2018-02-17 PROCEDURE — 87581 M.PNEUMON DNA AMP PROBE: CPT

## 2018-02-17 PROCEDURE — 83605 ASSAY OF LACTIC ACID: CPT

## 2018-02-17 PROCEDURE — 96375 TX/PRO/DX INJ NEW DRUG ADDON: CPT

## 2018-02-17 PROCEDURE — 99291 CRITICAL CARE FIRST HOUR: CPT | Mod: 25

## 2018-02-17 PROCEDURE — 87486 CHLMYD PNEUM DNA AMP PROBE: CPT

## 2018-02-17 PROCEDURE — 93460 R&L HRT ART/VENTRICLE ANGIO: CPT

## 2018-02-17 PROCEDURE — 94660 CPAP INITIATION&MGMT: CPT

## 2018-02-17 PROCEDURE — 87040 BLOOD CULTURE FOR BACTERIA: CPT

## 2018-02-17 PROCEDURE — 85730 THROMBOPLASTIN TIME PARTIAL: CPT

## 2018-02-17 PROCEDURE — 99239 HOSP IP/OBS DSCHRG MGMT >30: CPT

## 2018-02-17 PROCEDURE — 80048 BASIC METABOLIC PNL TOTAL CA: CPT

## 2018-02-17 PROCEDURE — C1889: CPT

## 2018-02-17 PROCEDURE — 83036 HEMOGLOBIN GLYCOSYLATED A1C: CPT

## 2018-02-17 RX ORDER — METOPROLOL TARTRATE 50 MG
100 TABLET ORAL DAILY
Qty: 0 | Refills: 0 | Status: DISCONTINUED | OUTPATIENT
Start: 2018-02-17 | End: 2018-02-17

## 2018-02-17 RX ORDER — FUROSEMIDE 40 MG
1 TABLET ORAL
Qty: 30 | Refills: 0
Start: 2018-02-17 | End: 2018-03-18

## 2018-02-17 RX ORDER — METOPROLOL TARTRATE 50 MG
1 TABLET ORAL
Qty: 30 | Refills: 0 | OUTPATIENT
Start: 2018-02-17 | End: 2018-03-18

## 2018-02-17 RX ADMIN — LISINOPRIL 5 MILLIGRAM(S): 2.5 TABLET ORAL at 05:22

## 2018-02-17 RX ADMIN — Medication 3 MILLILITER(S): at 08:22

## 2018-02-17 RX ADMIN — Medication 40 MILLIGRAM(S): at 05:22

## 2018-02-17 RX ADMIN — AMIODARONE HYDROCHLORIDE 200 MILLIGRAM(S): 400 TABLET ORAL at 05:22

## 2018-02-17 RX ADMIN — Medication 1 TABLET(S): at 09:15

## 2018-02-17 RX ADMIN — Medication 50 MILLIGRAM(S): at 05:22

## 2018-02-17 RX ADMIN — AZITHROMYCIN 255 MILLIGRAM(S): 500 TABLET, FILM COATED ORAL at 05:22

## 2018-02-17 NOTE — PROGRESS NOTE ADULT - PROBLEM SELECTOR PLAN 1
-responded to IV lasix  -ECHO results appreciated, lower EF compared to previous ECHO; also has severe Aortic regurgitation  -s/p LHC  -outpatient valve replacement  -resume eliquis at home   -cardiology consult appreciated  -needs LHC and C this admission  -NPO after midnight tonight?

## 2018-02-17 NOTE — PROGRESS NOTE ADULT - SUBJECTIVE AND OBJECTIVE BOX
[facility] - [facility]                                   [address] [place] [address]                                                      PHONE: [phone]                                                         FAX: [phone]  [email or web address]  ---------------------------------------------------------------------------------------------------------------------------------    Overnight events/patient complaints:  afib at 11pm last night   no events     PAST MEDICAL & SURGICAL HISTORY:  Abdominal aortic aneurysm  Atrial fibrillation  Overactive bladder  High cholesterol  Diabetes  Hypertension  H/O foot surgery  H/O cataract extraction      No Known Allergies    MEDICATIONS  (STANDING):  ALBUTerol    90 MICROgram(s) HFA Inhaler 1 Puff(s) Inhalation every 4 hours  ALBUTerol/ipratropium for Nebulization 3 milliLiter(s) Nebulizer every 4 hours  amiodarone    Tablet 200 milliGRAM(s) Oral daily  aspirin enteric coated 81 milliGRAM(s) Oral daily  atorvastatin 40 milliGRAM(s) Oral at bedtime  azithromycin  IVPB 500 milliGRAM(s) IV Intermittent every 24 hours  cefTRIAXone   IVPB 1 Gram(s) IV Intermittent every 24 hours  dextrose 5%. 1000 milliLiter(s) (50 mL/Hr) IV Continuous <Continuous>  dextrose 50% Injectable 12.5 Gram(s) IV Push once  dextrose 50% Injectable 25 Gram(s) IV Push once  dextrose 50% Injectable 25 Gram(s) IV Push once  furosemide    Tablet 40 milliGRAM(s) Oral daily  insulin lispro (HumaLOG) corrective regimen sliding scale   SubCutaneous three times a day before meals  insulin lispro (HumaLOG) corrective regimen sliding scale   SubCutaneous at bedtime  lactobacillus acidophilus 1 Tablet(s) Oral two times a day with meals  lisinopril 5 milliGRAM(s) Oral daily  metoprolol     tartrate 50 milliGRAM(s) Oral two times a day  oxybutynin XL 5 milliGRAM(s) Oral daily  tiotropium 18 MICROgram(s) Capsule 1 Capsule(s) Inhalation daily    MEDICATIONS  (PRN):  ALBUTerol    0.083% 2.5 milliGRAM(s) Nebulizer every 2 hours PRN Shortness of Breath and/or Wheezing  dextrose Gel 1 Dose(s) Oral once PRN Blood Glucose LESS THAN 70 milliGRAM(s)/deciliter  glucagon  Injectable 1 milliGRAM(s) IntraMuscular once PRN Glucose LESS THAN 70 milligrams/deciliter      Vital Signs Last 24 Hrs  T(C): 36.7 (17 Feb 2018 08:14), Max: 36.9 (17 Feb 2018 05:21)  T(F): 98 (17 Feb 2018 08:14), Max: 98.4 (17 Feb 2018 05:21)  HR: 101 (17 Feb 2018 08:14) (58 - 123)  BP: 120/62 (17 Feb 2018 08:14) (107/67 - 170/59)  BP(mean): --  RR: 17 (17 Feb 2018 08:14) (16 - 18)  SpO2: 93% (17 Feb 2018 08:14) (92% - 98%)  ICU Vital Signs Last 24 Hrs  ELAN MOSS  I&O's Detail    16 Feb 2018 07:01  -  17 Feb 2018 07:00  --------------------------------------------------------  IN:  Total IN: 0 mL    OUT:    Voided: 550 mL  Total OUT: 550 mL    Total NET: -550 mL        I&O's Summary    16 Feb 2018 07:01  -  17 Feb 2018 07:00  --------------------------------------------------------  IN: 0 mL / OUT: 550 mL / NET: -550 mL      Drug Dosing Weight  ELAN MASSA      PHYSICAL EXAM:  General: Appears well developed, well nourished alert and cooperative.  HEENT: Head; normocephalic, atraumatic.  Eyes: Pupils reactive, cornea wnl.  Neck: Supple, no nodes adenopathy, no NVD or carotid bruit or thyromegaly.  CARDIOVASCULAR: Normal S1 and S2, No murmur, rub, gallop or lift.   LUNGS: No rales, rhonchi or wheeze. Normal breath sounds bilaterally.  ABDOMEN: Soft, nontender without mass or organomegaly. bowel sounds normoactive.  EXTREMITIES: No clubbing, cyanosis or edema. Distal pulses wnl.   SKIN: warm and dry with normal turgor.  NEURO: Alert/oriented x 3/normal motor exam. No pathologic reflexes.    PSYCH: normal affect.        LABS:                        12.3   10.5  )-----------( 216      ( 17 Feb 2018 07:13 )             40.2     02-17    144  |  97<L>  |  31.0<H>  ----------------------------<  140<H>  3.9   |  32.0<H>  |  0.91    Ca    8.8      17 Feb 2018 07:13  Phos  4.3     02-17  Mg     2.2     02-17      [facility]            RADIOLOGY & ADDITIONAL STUDIES:    INTERPRETATION OF TELEMETRY (personally reviewed):    ECHO:  < from: TTE Echo Complete w/Doppler (02.13.18 @ 11:17) >   1. Severely decreased global left ventricular systolic function.   2. Left ventricular ejection fraction, by visual estimation, is 30 to 35%.   3. Spectral Doppler shows pseudonormal pattern of left ventricular myocardial filling (Grade II diastolic dysfunction).   4. Mild RV systolic dysfunction.   5. Severe left and right atrial enlargement.   6. Moderate to severe aortic regurgitation.   7. Thickening and calcification of the anterior and posterior mitral valve leaflets.   8. Moderate mitral valve regurgitation.   9.Mild mitral annular calcification.  10. Moderate tricuspid regurgitation.  11. Estimated pulmonary artery systolic pressure is 48.2 mmHg assuming a right atrial pressure of 15 mmHg, which is consistent with mild pulmonary hypertension.  12. Endocardial visualization was enhanced with intravenous echo contrast.  13. Moderate pleural effusion in both left and right lateral regions.  14. Small plaque involving the ascending aorta.  15. There is no evidence of pericardial effusion.    ASSESSMENT AND PLAN:  In summary, ELAN MOSS is an 83y Female with past medical history significant for HTN, HLD, moderate AR, paroxsymal atrial fibrillation and CVA without residual deficit who presents with pulmonary edema found to have new severely reduced LVEF and progression of her aortic regurgitation     Acute decompensated HFrEF  NYHA class III, AHA stage C; etiology likely multifactorial including progressive valvulopathy and possible CAD   - continue diuresis with lasix IV   -  metoprolol succinate 100 and continue lisinopril      Moderate to severe AR  - Patient with dilated LV (LVIDd) and EF 30-35; will need evaluation for AVR (class I indication)  - LHC/RHC as above   -Pt to have outpt surgical evalution     Paroxsymal atrial fibrillation with hx CVA   - continue apixiban   - BB and amiodarone for rate/rhythm control      -pt stable for d/c home today     Thank you for allowing [facility] to participate in the care of this patient.  Please feel free to call with any questions or concerns.

## 2018-02-17 NOTE — PROGRESS NOTE ADULT - SUBJECTIVE AND OBJECTIVE BOX
is a pleasant 82y/o female who follows w/ as an outpatient. She presented w/acute pulmonary edema and responded to IV lasix. Her echo reveals a low EF of 30-35% and it reveals severe aortic regurgitation. She's s/p cath and can be discharged home today.    She's doing well and reports feeling "comfortable."    Summary:   REVIEW OF SYSTEMS    General:	"I'm feeling better."    Skin/Breast:  	  Ophthalmologic:  	  ENMT:	    Respiratory and Thorax:  	  Cardiovascular:	    Gastrointestinal:	    Genitourinary:	    Musculoskeletal:	    Neurological:	    Psychiatric:	    Hematology/Lymphatics:	    Endocrine:	    Allergic/Immunologic:	  Vital Signs Last 24 Hrs  108/68, HR= 98  PHYSICAL EXAM:  GEN: mild distress, comfortable, speaking full sentences, awake, alert  HEENT: MMM  CVS: S1S2 RRR  PULM: good breath sounds, no crackles at the bases  ABD: soft, nontender, no rebound, no guarding  EXTREM: no edema                              12.3   10.5  )-----------( 216      ( 17 Feb 2018 07:13 )             40.2     02-17    144  |  97<L>  |  31.0<H>  ----------------------------<  140<H>  3.9   |  32.0<H>  |  0.91    Ca    8.8      17 Feb 2018 07:13  Phos  4.3     02-17  Mg     2.2     02-17    Radiology:     MEDICATIONS  (STANDING):  amiodarone    Tablet 200 milliGRAM(s) Oral daily  aspirin enteric coated 81 milliGRAM(s) Oral daily  atorvastatin 40 milliGRAM(s) Oral at bedtime  dextrose 5%. 1000 milliLiter(s) (50 mL/Hr) IV Continuous <Continuous>  dextrose 50% Injectable 12.5 Gram(s) IV Push once  dextrose 50% Injectable 25 Gram(s) IV Push once  dextrose 50% Injectable 25 Gram(s) IV Push once  furosemide    Tablet 40 milliGRAM(s) Oral daily  insulin lispro (HumaLOG) corrective regimen sliding scale   SubCutaneous three times a day before meals  insulin lispro (HumaLOG) corrective regimen sliding scale   SubCutaneous at bedtime  lactobacillus acidophilus 1 Tablet(s) Oral two times a day with meals  lisinopril 5 milliGRAM(s) Oral daily  metoprolol succinate  milliGRAM(s) Oral daily  oxybutynin XL 5 milliGRAM(s) Oral daily  tiotropium 18 MICROgram(s) Capsule 1 Capsule(s) Inhalation daily    MEDICATIONS  (PRN):  dextrose Gel 1 Dose(s) Oral once PRN Blood Glucose LESS THAN 70 milliGRAM(s)/deciliter  glucagon  Injectable 1 milliGRAM(s) IntraMuscular once PRN Glucose LESS THAN 70 milligrams/deciliter

## 2018-02-17 NOTE — PROGRESS NOTE ADULT - ASSESSMENT
has:
1. 84 yo F admitted with cardiogenic pulmonary edema. Echo documents signif AI with mod MR and TR/dilated atria and LVEF 30+%. She has systolic and diastolic chf. Will add ACE-I/continue diuretics/monitor lytes. She'll need L&R heart cath this adm.  2. copd-nonsmoker x 10 yrs.  3. AODM  4. thoracic and abd aortic aneurysms.

## 2018-02-18 LAB
CULTURE RESULTS: SIGNIFICANT CHANGE UP
CULTURE RESULTS: SIGNIFICANT CHANGE UP
SPECIMEN SOURCE: SIGNIFICANT CHANGE UP
SPECIMEN SOURCE: SIGNIFICANT CHANGE UP

## 2018-02-18 RX ORDER — APIXABAN 2.5 MG/1
1 TABLET, FILM COATED ORAL
Qty: 60 | Refills: 0 | OUTPATIENT
Start: 2018-02-18 | End: 2018-03-19

## 2018-02-20 ENCOUNTER — APPOINTMENT (OUTPATIENT)
Dept: VASCULAR SURGERY | Facility: CLINIC | Age: 83
End: 2018-02-20

## 2018-02-21 RX ORDER — ALBUTEROL 90 UG/1
2 AEROSOL, METERED ORAL
Qty: 1 | Refills: 0
Start: 2018-02-21

## 2018-03-14 ENCOUNTER — OUTPATIENT (OUTPATIENT)
Dept: OUTPATIENT SERVICES | Facility: HOSPITAL | Age: 83
LOS: 1 days | End: 2018-03-14
Payer: MEDICARE

## 2018-03-14 VITALS
HEIGHT: 62 IN | WEIGHT: 141.1 LBS | SYSTOLIC BLOOD PRESSURE: 121 MMHG | DIASTOLIC BLOOD PRESSURE: 50 MMHG | RESPIRATION RATE: 18 BRPM | TEMPERATURE: 98 F | HEART RATE: 63 BPM | OXYGEN SATURATION: 94 %

## 2018-03-14 VITALS — WEIGHT: 149.91 LBS | HEIGHT: 62 IN

## 2018-03-14 DIAGNOSIS — Z98.890 OTHER SPECIFIED POSTPROCEDURAL STATES: Chronic | ICD-10-CM

## 2018-03-14 DIAGNOSIS — I50.9 HEART FAILURE, UNSPECIFIED: ICD-10-CM

## 2018-03-14 DIAGNOSIS — Z01.810 ENCOUNTER FOR PREPROCEDURAL CARDIOVASCULAR EXAMINATION: ICD-10-CM

## 2018-03-14 DIAGNOSIS — Z98.49 CATARACT EXTRACTION STATUS, UNSPECIFIED EYE: Chronic | ICD-10-CM

## 2018-03-14 DIAGNOSIS — Z98.89 OTHER SPECIFIED POSTPROCEDURAL STATES: Chronic | ICD-10-CM

## 2018-03-14 LAB
ANION GAP SERPL CALC-SCNC: 14 MMOL/L — SIGNIFICANT CHANGE UP (ref 5–17)
APTT BLD: 34.8 SEC — SIGNIFICANT CHANGE UP (ref 27.5–37.4)
BASOPHILS # BLD AUTO: 0 K/UL — SIGNIFICANT CHANGE UP (ref 0–0.2)
BASOPHILS NFR BLD AUTO: 0.2 % — SIGNIFICANT CHANGE UP (ref 0–2)
BLD GP AB SCN SERPL QL: SIGNIFICANT CHANGE UP
BUN SERPL-MCNC: 34 MG/DL — HIGH (ref 8–20)
CALCIUM SERPL-MCNC: 9.3 MG/DL — SIGNIFICANT CHANGE UP (ref 8.6–10.2)
CHLORIDE SERPL-SCNC: 101 MMOL/L — SIGNIFICANT CHANGE UP (ref 98–107)
CHOLEST SERPL-MCNC: 133 MG/DL — SIGNIFICANT CHANGE UP (ref 110–199)
CO2 SERPL-SCNC: 27 MMOL/L — SIGNIFICANT CHANGE UP (ref 22–29)
CREAT SERPL-MCNC: 1.18 MG/DL — SIGNIFICANT CHANGE UP (ref 0.5–1.3)
EOSINOPHIL # BLD AUTO: 0.2 K/UL — SIGNIFICANT CHANGE UP (ref 0–0.5)
EOSINOPHIL NFR BLD AUTO: 2.5 % — SIGNIFICANT CHANGE UP (ref 0–6)
GLUCOSE SERPL-MCNC: 109 MG/DL — SIGNIFICANT CHANGE UP (ref 70–115)
HCT VFR BLD CALC: 35 % — LOW (ref 37–47)
HDLC SERPL-MCNC: 44 MG/DL — LOW
HGB BLD-MCNC: 10.8 G/DL — LOW (ref 12–16)
INR BLD: 1.61 RATIO — HIGH (ref 0.88–1.16)
LIPID PNL WITH DIRECT LDL SERPL: 68 MG/DL — SIGNIFICANT CHANGE UP
LYMPHOCYTES # BLD AUTO: 1.1 K/UL — SIGNIFICANT CHANGE UP (ref 1–4.8)
LYMPHOCYTES # BLD AUTO: 18.5 % — LOW (ref 20–55)
MAGNESIUM SERPL-MCNC: 2.2 MG/DL — SIGNIFICANT CHANGE UP (ref 1.6–2.6)
MCHC RBC-ENTMCNC: 27.2 PG — SIGNIFICANT CHANGE UP (ref 27–31)
MCHC RBC-ENTMCNC: 30.9 G/DL — LOW (ref 32–36)
MCV RBC AUTO: 88.2 FL — SIGNIFICANT CHANGE UP (ref 81–99)
MONOCYTES # BLD AUTO: 0.8 K/UL — SIGNIFICANT CHANGE UP (ref 0–0.8)
MONOCYTES NFR BLD AUTO: 12.8 % — HIGH (ref 3–10)
NEUTROPHILS # BLD AUTO: 3.9 K/UL — SIGNIFICANT CHANGE UP (ref 1.8–8)
NEUTROPHILS NFR BLD AUTO: 65.7 % — SIGNIFICANT CHANGE UP (ref 37–73)
PLATELET # BLD AUTO: 203 K/UL — SIGNIFICANT CHANGE UP (ref 150–400)
POTASSIUM SERPL-MCNC: 5 MMOL/L — SIGNIFICANT CHANGE UP (ref 3.5–5.3)
POTASSIUM SERPL-SCNC: 5 MMOL/L — SIGNIFICANT CHANGE UP (ref 3.5–5.3)
PROTHROM AB SERPL-ACNC: 17.9 SEC — HIGH (ref 9.8–12.7)
RBC # BLD: 3.97 M/UL — LOW (ref 4.4–5.2)
RBC # FLD: 14.7 % — SIGNIFICANT CHANGE UP (ref 11–15.6)
SODIUM SERPL-SCNC: 142 MMOL/L — SIGNIFICANT CHANGE UP (ref 135–145)
TOTAL CHOLESTEROL/HDL RATIO MEASUREMENT: 3 RATIO — LOW (ref 3.3–7.1)
TRIGL SERPL-MCNC: 106 MG/DL — SIGNIFICANT CHANGE UP (ref 10–200)
TSH SERPL-MCNC: <0.1 UIU/ML — LOW (ref 0.27–4.2)
TYPE + AB SCN PNL BLD: SIGNIFICANT CHANGE UP
WBC # BLD: 6 K/UL — SIGNIFICANT CHANGE UP (ref 4.8–10.8)
WBC # FLD AUTO: 6 K/UL — SIGNIFICANT CHANGE UP (ref 4.8–10.8)

## 2018-03-14 PROCEDURE — 36415 COLL VENOUS BLD VENIPUNCTURE: CPT

## 2018-03-14 PROCEDURE — 93005 ELECTROCARDIOGRAM TRACING: CPT

## 2018-03-14 PROCEDURE — 85610 PROTHROMBIN TIME: CPT

## 2018-03-14 PROCEDURE — 85027 COMPLETE CBC AUTOMATED: CPT

## 2018-03-14 PROCEDURE — G0463: CPT

## 2018-03-14 PROCEDURE — 86850 RBC ANTIBODY SCREEN: CPT

## 2018-03-14 PROCEDURE — 86901 BLOOD TYPING SEROLOGIC RH(D): CPT

## 2018-03-14 PROCEDURE — 80048 BASIC METABOLIC PNL TOTAL CA: CPT

## 2018-03-14 PROCEDURE — 84443 ASSAY THYROID STIM HORMONE: CPT

## 2018-03-14 PROCEDURE — 83735 ASSAY OF MAGNESIUM: CPT

## 2018-03-14 PROCEDURE — 86900 BLOOD TYPING SEROLOGIC ABO: CPT

## 2018-03-14 PROCEDURE — 80061 LIPID PANEL: CPT

## 2018-03-14 PROCEDURE — 85730 THROMBOPLASTIN TIME PARTIAL: CPT

## 2018-03-14 PROCEDURE — 93010 ELECTROCARDIOGRAM REPORT: CPT

## 2018-03-14 RX ORDER — OXYBUTYNIN CHLORIDE 5 MG
1 TABLET ORAL
Qty: 0 | Refills: 0 | COMMUNITY

## 2018-03-14 NOTE — H&P PST ADULT - NSANTHOSAYNRD_GEN_A_CORE
No. ONDINA screening performed.  STOP BANG Legend: 0-2 = LOW Risk; 3-4 = INTERMEDIATE Risk; 5-8 = HIGH Risk

## 2018-03-14 NOTE — H&P PST ADULT - NEGATIVE GASTROINTESTINAL SYMPTOMS
no diarrhea/no change in bowel habits/no vomiting/no constipation/no abdominal pain/no nausea/no flatulence/no melena

## 2018-03-14 NOTE — H&P PST ADULT - NEGATIVE PSYCHIATRIC SYMPTOMS
no depression/no anxiety/no suicidal ideation/no agitation/no visual hallucinations/no insomnia/no paranoia/no mood swings/no memory loss/no auditory hallucinations/no hyperactivity/stressed w/'s cancer tx

## 2018-03-14 NOTE — H&P PST ADULT - RS GEN PE MLT RESP DETAILS PC
no chest wall tenderness/no intercostal retractions/no rales/good air movement/no rhonchi/no wheezes/respirations non-labored/clear to auscultation bilaterally/normal/airway patent/breath sounds equal

## 2018-03-14 NOTE — ASU PATIENT PROFILE, ADULT - VISION (WITH CORRECTIVE LENSES IF THE PATIENT USUALLY WEARS THEM):
Normal vision: sees adequately in most situations; can see medication labels, newsprint/needs glasses to read  med labels

## 2018-03-14 NOTE — H&P PST ADULT - PMH
Abdominal aortic aneurysm  5.5 cm AAA  Aortic valve insufficiency    Ascending aortic aneurysm  4.6 cm  Atrial fibrillation    Bladder incontinence    CHF (congestive heart failure), NYHA class II, chronic, combined    Diabetes    Edema of both ankles  trace  Ejection fraction < 50%  35% on 2018 echo  Former smoker    Heart failure, NYHA class 2  chronic combined  High cholesterol    Hypertension    LV dysfunction    LV dysfunction    Overactive bladder    Pulmonary HTN    SOB (shortness of breath)    Stress at home   with bladder cancer  Thyroid enlarged  Followed by Dr. Brian (endocrinologist)  TIA (transient ischemic attack)  2017 [facility] Abdominal aortic aneurysm  5.5 cm AAA  Aortic valve insufficiency    Ascending aortic aneurysm  4.6 cm  Atrial fibrillation    Bladder incontinence    CHF (congestive heart failure), NYHA class II, chronic, combined    Diabetes    Diastolic dysfunction with heart failure  grade II  Edema of both ankles  trace  Ejection fraction < 50%  35% on 2018 echo  Former smoker    Heart failure, NYHA class 2  chronic combined  High cholesterol    Hypertension    LV dysfunction    LV dysfunction    Mitral valve regurgitation    Overactive bladder    Pleural effusion  tanner moderate  Pulmonary HTN    Right and left atrial hypertrophy    Right ventricular dysfunction    Severe aortic regurgitation    SOB (shortness of breath)    Stress at home   with bladder cancer  Thyroid enlarged  Followed by Dr. Brian (endocrinologist)  TIA (transient ischemic attack)  2017 [facility]  Tricuspid regurgitation  moderate

## 2018-03-14 NOTE — H&P PST ADULT - NEGATIVE ENMT SYMPTOMS
no nasal discharge/no ear pain/no hearing difficulty/no sinus symptoms/no tinnitus/no nasal congestion/no nasal obstruction/no post-nasal discharge/no nose bleeds/no vertigo

## 2018-03-14 NOTE — H&P PST ADULT - ASSESSMENT
A- to have cardiomems for continued medical management of heart failure   On entresto/lasix w/improvement  TSH <0.10 sees endocrinologist

## 2018-03-14 NOTE — H&P PST ADULT - NEGATIVE NEUROLOGICAL SYMPTOMS
no loss of sensation/no headache/no facial palsy/no difficulty walking/no loss of consciousness/no weakness/no paresthesias/no syncope/no confusion/s/p TIA no residual/no hemiparesis/no transient paralysis/no focal seizures/no tremors/no vertigo

## 2018-03-14 NOTE — H&P PST ADULT - HISTORY OF PRESENT ILLNESS
84 y/o white female presents to PST following a recent [facility] February 2018 DIAGNOSTIC IMPRESSIONS: Moderate pulmonary HTN.  [id number].  Moderate LV dysfunction.  Nonobstructive CAD.  DIAGNOSTIC RECOMMENDATIONS: ACE/diuretics.  Outpt Cardiomems.  INTERVENTIONAL IMPRESSIONS: Moderate pulmonary HTN.  [id number].  Moderate LV dysfunction.  Nonobstructive CAD    ECHO:  Summary:   1. Severely decreased global left ventricular systolic function.   2. Left ventricular ejection fraction, by visual estimation, is 30 to   35%.   3. Spectral Doppler shows pseudonormal pattern of left ventricular   myocardial filling (Grade II diastolic dysfunction).   4. Mild RVsystolic dysfunction.   5. Severe left and right atrial enlargement.   6. Moderate to severe aortic regurgitation.   7. Thickening and calcification of the anterior and posterior mitral   valve leaflets.   8. Moderate mitral valve regurgitation.   9.Mild mitral annular calcification.  10. Moderate tricuspid regurgitation.  11. Estimated pulmonary artery systolic pressure is 48.2 mmHg assuming a   right atrial pressure of 15 mmHg, which is consistent with mild pulmonary   hypertension.  12. Endocardial visualization was enhanced with intravenous echo contrast.  13. Moderate pleural effusion in both left and right lateral regions.  14. Small plaque involving the ascending aorta.  15. There is no evidence of pericardial effusion.    Been improved on ACE/diuretics

## 2018-03-14 NOTE — H&P PST ADULT - NEGATIVE OPHTHALMOLOGIC SYMPTOMS
no pain R/no irritation L/no scleral injection L/no diplopia/no irritation R/no loss of vision R/no lacrimation L/no lacrimation R/no blurred vision L/no discharge L/no loss of vision L/no photophobia/no scleral injection R/no blurred vision R/no discharge R/no pain L

## 2018-03-14 NOTE — ASU PATIENT PROFILE, ADULT - PMH
Abdominal aortic aneurysm    Aortic valve insufficiency    Atrial fibrillation    CHF (congestive heart failure), NYHA class II, chronic, combined    Diabetes    Ejection fraction < 50%    High cholesterol    Hypertension    LV dysfunction    Overactive bladder

## 2018-03-14 NOTE — ASU PATIENT PROFILE, ADULT - PSH
H/O cataract extraction  bilateral  H/O foot surgery  left  foot  hammer toe  and bunion repair  History of coronary angiogram  feb 2018  History of tonsillectomy

## 2018-03-14 NOTE — H&P PST ADULT - NEGATIVE GENERAL SYMPTOMS
no weight loss/no weight gain/no polyphagia/no sweating/no anorexia/no fever/no polyuria/no polydipsia

## 2018-03-14 NOTE — H&P PST ADULT - NEGATIVE MUSCULOSKELETAL SYMPTOMS
no arm pain L/no back pain/no muscle cramps/no myalgia/no stiffness/no arm pain R/no leg pain L/no muscle weakness/no neck pain/no leg pain R/no joint swelling

## 2018-03-14 NOTE — H&P PST ADULT - NEUROLOGICAL DETAILS
responds to verbal commands/sensation intact/cranial nerves intact/deep reflexes intact/alert and oriented x 3/responds to pain

## 2018-03-14 NOTE — H&P PST ADULT - MUSCULOSKELETAL
details… detailed exam no joint swelling/no joint erythema/no joint warmth/no calf tenderness/normal/ROM intact/normal strength

## 2018-03-19 ENCOUNTER — TRANSCRIPTION ENCOUNTER (OUTPATIENT)
Age: 83
End: 2018-03-19

## 2018-03-19 ENCOUNTER — OUTPATIENT (OUTPATIENT)
Dept: OUTPATIENT SERVICES | Facility: HOSPITAL | Age: 83
LOS: 1 days | Discharge: HOME | End: 2018-03-19
Payer: MEDICARE

## 2018-03-19 VITALS
RESPIRATION RATE: 20 BRPM | SYSTOLIC BLOOD PRESSURE: 134 MMHG | HEART RATE: 58 BPM | DIASTOLIC BLOOD PRESSURE: 67 MMHG | OXYGEN SATURATION: 95 %

## 2018-03-19 VITALS
OXYGEN SATURATION: 98 % | HEART RATE: 61 BPM | RESPIRATION RATE: 20 BRPM | DIASTOLIC BLOOD PRESSURE: 68 MMHG | SYSTOLIC BLOOD PRESSURE: 171 MMHG

## 2018-03-19 DIAGNOSIS — Z01.810 ENCOUNTER FOR PREPROCEDURAL CARDIOVASCULAR EXAMINATION: ICD-10-CM

## 2018-03-19 DIAGNOSIS — Z98.890 OTHER SPECIFIED POSTPROCEDURAL STATES: Chronic | ICD-10-CM

## 2018-03-19 DIAGNOSIS — Z98.89 OTHER SPECIFIED POSTPROCEDURAL STATES: Chronic | ICD-10-CM

## 2018-03-19 DIAGNOSIS — Z98.49 CATARACT EXTRACTION STATUS, UNSPECIFIED EYE: Chronic | ICD-10-CM

## 2018-03-19 DIAGNOSIS — I50.21 ACUTE SYSTOLIC (CONGESTIVE) HEART FAILURE: ICD-10-CM

## 2018-03-19 LAB — GLUCOSE BLDC GLUCOMTR-MCNC: 100 MG/DL — HIGH (ref 70–99)

## 2018-03-19 PROCEDURE — 93568 NJX CAR CTH NSLC P-ART ANGRP: CPT

## 2018-03-19 PROCEDURE — 82962 GLUCOSE BLOOD TEST: CPT

## 2018-03-19 PROCEDURE — C2624: CPT

## 2018-03-19 PROCEDURE — C1889: CPT

## 2018-03-19 PROCEDURE — 93451 RIGHT HEART CATH: CPT

## 2018-03-19 PROCEDURE — C1894: CPT

## 2018-03-19 RX ORDER — APIXABAN 2.5 MG/1
1 TABLET, FILM COATED ORAL
Qty: 60 | Refills: 0
Start: 2018-03-19 | End: 2018-04-17

## 2018-03-19 NOTE — PROGRESS NOTE ADULT - SUBJECTIVE AND OBJECTIVE BOX
Cardiology NP    Pt with h/o pulmonary HTN.  For cardiomems.  ASA 2  Mallampati 2  No change in medical history since PST.

## 2018-03-19 NOTE — PROGRESS NOTE ADULT - SUBJECTIVE AND OBJECTIVE BOX
Nurse Practitioner Progress note:     INTERVAL HISTORY: Pt with PAF and pHTN for cardiomems        TELEMETRY: NSR 78 bpm      PHYSICAL EXAM:  Appearance: Normal	  Cardiovascular: Normal S1 S2, No JVD, No murmurs, No edema  Respiratory: Lungs clear to auscultation	  Psychiatry: A & O x 3, Mood & affect appropriate  Neurologic: Non-focal, A&O X3.  No neuro deficits  Procedure Site: Right groin site benign.  No bleeding/hematoma/ecchymosis.  + palp pedal pulse      PROCEDURE RESULTS: S/P successful cardiomems via right groin.    ASSESSMENT/PLAN:   -Sheath removed by RN on arrival to holding	  -Groin precautions  -Bedrest X 3h  -Resume home meds  -Hold AC for 48 hours  -Follow up with Dr. Cobb  -Discharge when criteria met

## 2018-03-19 NOTE — DISCHARGE NOTE ADULT - MEDICATION SUMMARY - MEDICATIONS TO TAKE
I will START or STAY ON the medications listed below when I get home from the hospital:    bilberry  capsule  -- 1 cap(s) by mouth 2 times a day  -- Indication: For supplement    Aspirin Low Dose 81 mg oral delayed release tablet  -- 1 tab(s) by mouth once a day  -- Indication: For Aspirin    sacubitril-valsartan 24 mg-26 mg oral tablet  -- 1 tab(s) by mouth 2 times a day  -- Indication: For heart    amiodarone 200 mg oral tablet  -- 1 tab(s) by mouth once a day  -- Avoid grapefruit and grapefruit juice while taking this medication.  Avoid prolonged or excessive exposure to direct and/or artificial sunlight while taking this medication.  Do not take this drug if you are pregnant.  It is very important that you take or use this exactly as directed.  Do not skip doses or discontinue unless directed by your doctor.  May cause drowsiness or dizziness.    -- Indication: For heart    metFORMIN 500 mg oral tablet  -- 1 tab(s) by mouth once a day  -- Indication: For diabetes    atorvastatin 40 mg oral tablet  -- 1 tab(s) by mouth once a day  -- Indication: For hyperlipdiemia    Metoprolol Tartrate 50 mg oral tablet  -- 1 tab(s) by mouth 2 times a day  -- Indication: For heart    Ventolin HFA 90 mcg/inh inhalation aerosol  -- 2 puff(s) inhaled every 6 hours, As Needed MDD:8 puffs  -- For inhalation only.  It is very important that you take or use this exactly as directed.  Do not skip doses or discontinue unless directed by your doctor.  Obtain medical advice before taking any non-prescription drugs as some may affect the action of this medication.  Shake well before use.    -- Indication: For lungs    Lasix 20 mg oral tablet  -- 1 tab(s) by mouth once a day MDD:1 tab  -- Avoid prolonged or excessive exposure to direct and/or artificial sunlight while taking this medication.  It is very important that you take or use this exactly as directed.  Do not skip doses or discontinue unless directed by your doctor.  It may be advisable to drink a full glass orange juice or eat a banana daily while taking this medication.    -- Indication: For water pill    lactobacillus acidophilus oral capsule  -- 1 tab(s) by mouth 2 times a day MDD:2 tabs  -- Indication: For supplement    oxybutynin 10 mg/24 hr oral tablet, extended release  -- 1 tab(s) by mouth once a day  -- Indication: For urine    PreserVision oral tablet  -- 1 tab(s) by mouth 2 times a day  -- Indication: For eyes

## 2018-03-19 NOTE — DISCHARGE NOTE ADULT - PATIENT PORTAL LINK FT
You can access the [facility] Patient Portal, offered by [facility], by registering with the following website: [email or web address]

## 2018-03-19 NOTE — DISCHARGE NOTE ADULT - CARE PROVIDER_API CALL
Dar Carroll), Cardiovascular Disease; Internal Medicine  [address] [place] [address]  Phone: [phone]  Fax: [phone]

## 2018-03-19 NOTE — DISCHARGE NOTE ADULT - CARE PLAN
Principal Discharge DX:	Heart failure, NYHA class 2  Goal:	optimal cardiac function  Assessment and plan of treatment:	No heavy lifting, driving, sex, tub baths, swimming, or any activity that submerges the lower half of the body in water for 48 hours.  Limited walking and stairs for 48 hours.    Change the bandaid after 24 hours and every 24 hours after that.  Keep the puncture site dry and covered with a bandaid until a scab forms.    Observe the site frequently.  If bleeding or a large lump (the size of a golf ball or bigger) occurs lie flat, apply continuous direct pressure just above the puncture site for at least 10 minutes, and notify your physician immediately.  If the bleeding cannot be controlled, call 911 immediately for assistance.  Notify your physician of pain, swelling or any drainage.    Notify your physician immediately if coldness, numbness, discoloration or pain in your foot occurs.

## 2018-03-27 DIAGNOSIS — I50.42 CHRONIC COMBINED SYSTOLIC (CONGESTIVE) AND DIASTOLIC (CONGESTIVE) HEART FAILURE: ICD-10-CM

## 2018-07-17 PROBLEM — R09.89 OTHER SPECIFIED SYMPTOMS AND SIGNS INVOLVING THE CIRCULATORY AND RESPIRATORY SYSTEMS: Chronic | Status: ACTIVE | Noted: 2018-03-14

## 2018-11-04 NOTE — DISCHARGE NOTE ADULT - VISION (WITH CORRECTIVE LENSES IF THE PATIENT USUALLY WEARS THEM):
n/a Partially impaired: cannot see medication labels or newsprint, but can see obstacles in path, and the surrounding layout; can count fingers at arm's length

## 2018-11-16 NOTE — DISCHARGE NOTE ADULT - REASON FOR ADMISSION
difficulty breathing Scribe Attestation (For Scribes USE Only)... Attending Attestation (For Attendings USE Only).../Scribe Attestation (For Scribes USE Only)...

## 2019-03-04 PROBLEM — I50.30 UNSPECIFIED DIASTOLIC (CONGESTIVE) HEART FAILURE: Chronic | Status: ACTIVE | Noted: 2018-03-14

## 2019-03-04 PROBLEM — I51.9 HEART DISEASE, UNSPECIFIED: Chronic | Status: ACTIVE | Noted: 2018-03-14

## 2019-03-04 PROBLEM — I50.42 CHRONIC COMBINED SYSTOLIC (CONGESTIVE) AND DIASTOLIC (CONGESTIVE) HEART FAILURE: Chronic | Status: ACTIVE | Noted: 2018-03-14

## 2019-03-04 PROBLEM — I34.0 NONRHEUMATIC MITRAL (VALVE) INSUFFICIENCY: Chronic | Status: ACTIVE | Noted: 2018-03-14

## 2019-03-04 PROBLEM — F43.9 REACTION TO SEVERE STRESS, UNSPECIFIED: Chronic | Status: ACTIVE | Noted: 2018-03-14

## 2019-03-04 PROBLEM — R32 UNSPECIFIED URINARY INCONTINENCE: Chronic | Status: ACTIVE | Noted: 2018-03-14

## 2019-03-04 PROBLEM — J90 PLEURAL EFFUSION, NOT ELSEWHERE CLASSIFIED: Chronic | Status: ACTIVE | Noted: 2018-03-14

## 2019-03-04 PROBLEM — Z87.891 PERSONAL HISTORY OF NICOTINE DEPENDENCE: Chronic | Status: ACTIVE | Noted: 2018-03-14

## 2019-03-04 PROBLEM — I27.20 PULMONARY HYPERTENSION, UNSPECIFIED: Chronic | Status: ACTIVE | Noted: 2018-03-14

## 2019-03-04 PROBLEM — I07.1 RHEUMATIC TRICUSPID INSUFFICIENCY: Chronic | Status: ACTIVE | Noted: 2018-03-14

## 2019-03-04 PROBLEM — I51.7 CARDIOMEGALY: Chronic | Status: ACTIVE | Noted: 2018-03-14

## 2019-03-04 PROBLEM — R06.02 SHORTNESS OF BREATH: Chronic | Status: ACTIVE | Noted: 2018-03-14

## 2019-03-04 PROBLEM — I71.2 THORACIC AORTIC ANEURYSM, WITHOUT RUPTURE: Chronic | Status: ACTIVE | Noted: 2018-03-14

## 2019-03-04 PROBLEM — E04.9 NONTOXIC GOITER, UNSPECIFIED: Chronic | Status: ACTIVE | Noted: 2018-03-14

## 2019-03-04 PROBLEM — I35.1 NONRHEUMATIC AORTIC (VALVE) INSUFFICIENCY: Chronic | Status: ACTIVE | Noted: 2018-03-14

## 2019-03-04 PROBLEM — G45.9 TRANSIENT CEREBRAL ISCHEMIC ATTACK, UNSPECIFIED: Chronic | Status: ACTIVE | Noted: 2018-03-14

## 2019-03-04 PROBLEM — M25.471 EFFUSION, RIGHT ANKLE: Chronic | Status: ACTIVE | Noted: 2018-03-14

## 2019-03-04 PROBLEM — I50.9 HEART FAILURE, UNSPECIFIED: Chronic | Status: ACTIVE | Noted: 2018-03-14

## 2019-03-11 ENCOUNTER — OUTPATIENT (OUTPATIENT)
Dept: OUTPATIENT SERVICES | Facility: HOSPITAL | Age: 84
LOS: 1 days | End: 2019-03-11
Payer: MEDICARE

## 2019-03-11 DIAGNOSIS — Z51.89 ENCOUNTER FOR OTHER SPECIFIED AFTERCARE: ICD-10-CM

## 2019-03-11 DIAGNOSIS — Z98.89 OTHER SPECIFIED POSTPROCEDURAL STATES: Chronic | ICD-10-CM

## 2019-03-11 DIAGNOSIS — Z98.890 OTHER SPECIFIED POSTPROCEDURAL STATES: Chronic | ICD-10-CM

## 2019-03-11 DIAGNOSIS — R26.89 OTHER ABNORMALITIES OF GAIT AND MOBILITY: ICD-10-CM

## 2019-03-11 DIAGNOSIS — Z98.49 CATARACT EXTRACTION STATUS, UNSPECIFIED EYE: Chronic | ICD-10-CM

## 2019-04-29 PROCEDURE — 97112 NEUROMUSCULAR REEDUCATION: CPT

## 2019-04-29 PROCEDURE — 97110 THERAPEUTIC EXERCISES: CPT

## 2019-04-29 PROCEDURE — 97163 PT EVAL HIGH COMPLEX 45 MIN: CPT

## 2019-07-15 NOTE — ED ADULT NURSE REASSESSMENT NOTE - NIH STROKE SCALE: 11. EXTINCTION AND INATTENTION, QM
79yo F history of HTN DL DM PVD CVA with residual weakness depression cholecystectomy presenting with nausea/vomiting abdominal pain since 2pm- took a bite of a sandwich then felt sx develop- abdominal pain diffuse, cramping, bloating, non radiating, vomitus non-bloody, non-bilious, no diarrhea, no fevers/chills, no chest pain, shortness of breath, dysuria/hematuria, back pain, numbness/focal weakness beyond baseline. Constitutional: Well appearing. No acute distress. Non toxic. Abd soft, nt/nd, +BS. labs, imaging, ekg reviewed- pt still c/o nausea, vomiting- abdominal pain improved- labs reviewed, dw pt and daughter, will admit (0) No abnormality

## 2019-08-27 ENCOUNTER — APPOINTMENT (OUTPATIENT)
Dept: VASCULAR SURGERY | Facility: CLINIC | Age: 84
End: 2019-08-27
Payer: MEDICARE

## 2019-08-27 VITALS
HEIGHT: 63 IN | SYSTOLIC BLOOD PRESSURE: 125 MMHG | HEART RATE: 69 BPM | OXYGEN SATURATION: 96 % | TEMPERATURE: 97.5 F | DIASTOLIC BLOOD PRESSURE: 85 MMHG

## 2019-08-27 PROCEDURE — 93978 VASCULAR STUDY: CPT

## 2019-08-27 PROCEDURE — 99214 OFFICE O/P EST MOD 30 MIN: CPT

## 2019-08-30 RX ORDER — SULFAMETHOXAZOLE AND TRIMETHOPRIM 400; 80 MG/1; MG/1
400-80 TABLET ORAL
Qty: 14 | Refills: 0 | Status: DISCONTINUED | COMMUNITY
Start: 2016-12-01 | End: 2019-08-30

## 2020-08-24 NOTE — PHYSICAL THERAPY INITIAL EVALUATION ADULT - GAIT DISTANCE, PT EVAL
Quality 130: Documentation Of Current Medications In The Medical Record: Current Medications Documented Detail Level: Detailed Quality 431: Preventive Care And Screening: Unhealthy Alcohol Use - Screening: Patient screened for unhealthy alcohol use using a single question and scores less than 2 times per year Quality 226: Preventive Care And Screening: Tobacco Use: Screening And Cessation Intervention: Patient screened for tobacco use and is an ex/non-smoker 200 feet

## 2021-02-01 ENCOUNTER — APPOINTMENT (OUTPATIENT)
Dept: VASCULAR SURGERY | Facility: CLINIC | Age: 86
End: 2021-02-01

## 2021-03-02 ENCOUNTER — APPOINTMENT (OUTPATIENT)
Dept: VASCULAR SURGERY | Facility: CLINIC | Age: 86
End: 2021-03-02

## 2021-03-15 ENCOUNTER — APPOINTMENT (OUTPATIENT)
Dept: VASCULAR SURGERY | Facility: CLINIC | Age: 86
End: 2021-03-15
Payer: MEDICARE

## 2021-03-15 VITALS
SYSTOLIC BLOOD PRESSURE: 146 MMHG | DIASTOLIC BLOOD PRESSURE: 71 MMHG | WEIGHT: 155 LBS | OXYGEN SATURATION: 95 % | HEIGHT: 63 IN | HEART RATE: 63 BPM | BODY MASS INDEX: 27.46 KG/M2

## 2021-03-15 PROCEDURE — 99213 OFFICE O/P EST LOW 20 MIN: CPT

## 2021-03-15 PROCEDURE — 93970 EXTREMITY STUDY: CPT

## 2021-03-15 RX ORDER — APIXABAN 5 MG/1
5 TABLET, FILM COATED ORAL
Qty: 180 | Refills: 0 | Status: ACTIVE | COMMUNITY
Start: 2021-02-11

## 2021-03-15 RX ORDER — AMOXICILLIN 500 MG/1
500 CAPSULE ORAL
Qty: 40 | Refills: 0 | Status: COMPLETED | COMMUNITY
Start: 2020-11-21 | End: 2021-03-15

## 2021-03-15 RX ORDER — SACUBITRIL AND VALSARTAN 49; 51 MG/1; MG/1
49-51 TABLET, FILM COATED ORAL
Qty: 60 | Refills: 0 | Status: ACTIVE | COMMUNITY
Start: 2021-01-11

## 2021-03-15 RX ORDER — FUROSEMIDE 20 MG/1
20 TABLET ORAL
Qty: 90 | Refills: 0 | Status: ACTIVE | COMMUNITY
Start: 2020-10-02

## 2021-03-15 RX ORDER — METOPROLOL TARTRATE 100 MG/1
100 TABLET, FILM COATED ORAL
Qty: 180 | Refills: 0 | Status: ACTIVE | COMMUNITY
Start: 2021-02-11

## 2021-03-24 ENCOUNTER — OUTPATIENT (OUTPATIENT)
Dept: OUTPATIENT SERVICES | Facility: HOSPITAL | Age: 86
LOS: 1 days | End: 2021-03-24
Payer: MEDICARE

## 2021-03-24 ENCOUNTER — APPOINTMENT (OUTPATIENT)
Dept: CT IMAGING | Facility: CLINIC | Age: 86
End: 2021-03-24
Payer: MEDICARE

## 2021-03-24 DIAGNOSIS — Z98.890 OTHER SPECIFIED POSTPROCEDURAL STATES: Chronic | ICD-10-CM

## 2021-03-24 DIAGNOSIS — Z98.49 CATARACT EXTRACTION STATUS, UNSPECIFIED EYE: Chronic | ICD-10-CM

## 2021-03-24 DIAGNOSIS — Z98.89 OTHER SPECIFIED POSTPROCEDURAL STATES: Chronic | ICD-10-CM

## 2021-03-24 DIAGNOSIS — I71.4 ABDOMINAL AORTIC ANEURYSM, WITHOUT RUPTURE: ICD-10-CM

## 2021-03-24 PROCEDURE — 74174 CTA ABD&PLVS W/CONTRAST: CPT

## 2021-03-24 PROCEDURE — 82565 ASSAY OF CREATININE: CPT

## 2021-03-24 PROCEDURE — G1004: CPT

## 2021-03-24 PROCEDURE — 74174 CTA ABD&PLVS W/CONTRAST: CPT | Mod: 26,ME

## 2021-03-28 ENCOUNTER — NON-APPOINTMENT (OUTPATIENT)
Age: 86
End: 2021-03-28

## 2021-03-29 ENCOUNTER — NON-APPOINTMENT (OUTPATIENT)
Age: 86
End: 2021-03-29

## 2021-04-19 ENCOUNTER — APPOINTMENT (OUTPATIENT)
Dept: RADIOLOGY | Facility: CLINIC | Age: 86
End: 2021-04-19
Payer: MEDICARE

## 2021-04-19 ENCOUNTER — OUTPATIENT (OUTPATIENT)
Dept: OUTPATIENT SERVICES | Facility: HOSPITAL | Age: 86
LOS: 1 days | End: 2021-04-19
Payer: MEDICARE

## 2021-04-19 DIAGNOSIS — Z98.49 CATARACT EXTRACTION STATUS, UNSPECIFIED EYE: Chronic | ICD-10-CM

## 2021-04-19 DIAGNOSIS — Z98.89 OTHER SPECIFIED POSTPROCEDURAL STATES: Chronic | ICD-10-CM

## 2021-04-19 DIAGNOSIS — Z98.890 OTHER SPECIFIED POSTPROCEDURAL STATES: Chronic | ICD-10-CM

## 2021-04-19 DIAGNOSIS — Z00.00 ENCOUNTER FOR GENERAL ADULT MEDICAL EXAMINATION WITHOUT ABNORMAL FINDINGS: ICD-10-CM

## 2021-04-19 PROCEDURE — 71046 X-RAY EXAM CHEST 2 VIEWS: CPT

## 2021-04-19 PROCEDURE — 71046 X-RAY EXAM CHEST 2 VIEWS: CPT | Mod: 26

## 2021-04-28 ENCOUNTER — APPOINTMENT (OUTPATIENT)
Dept: VASCULAR SURGERY | Facility: CLINIC | Age: 86
End: 2021-04-28
Payer: MEDICARE

## 2021-04-28 VITALS
HEART RATE: 65 BPM | HEIGHT: 63 IN | TEMPERATURE: 97.3 F | BODY MASS INDEX: 28.35 KG/M2 | OXYGEN SATURATION: 95 % | DIASTOLIC BLOOD PRESSURE: 75 MMHG | WEIGHT: 160 LBS | SYSTOLIC BLOOD PRESSURE: 144 MMHG

## 2021-04-28 DIAGNOSIS — I71.4 ABDOMINAL AORTIC ANEURYSM, W/OUT RUPTURE: ICD-10-CM

## 2021-04-28 PROCEDURE — 99213 OFFICE O/P EST LOW 20 MIN: CPT

## 2021-04-28 RX ORDER — METOPROLOL TARTRATE 50 MG/1
50 TABLET, FILM COATED ORAL
Qty: 180 | Refills: 0 | Status: COMPLETED | COMMUNITY
Start: 2016-08-29 | End: 2021-04-28

## 2021-04-28 RX ORDER — AZELASTINE HYDROCHLORIDE 0.5 MG/ML
0.05 SOLUTION/ DROPS OPHTHALMIC
Qty: 6 | Refills: 0 | Status: COMPLETED | COMMUNITY
Start: 2016-08-22 | End: 2021-04-28

## 2021-04-28 RX ORDER — VIT C/E/ZN/COPPR/LUTEIN/ZEAXAN 250MG-90MG
CAPSULE ORAL
Refills: 0 | Status: ACTIVE | COMMUNITY
Start: 2021-04-28

## 2021-04-28 RX ORDER — METOPROLOL TARTRATE 25 MG/1
25 TABLET, FILM COATED ORAL
Qty: 180 | Refills: 0 | Status: COMPLETED | COMMUNITY
Start: 2020-01-09 | End: 2021-04-28

## 2021-04-28 RX ORDER — NAFTIFINE HYDROCHLORIDE 20 MG/G
2 CREAM TOPICAL
Qty: 60 | Refills: 0 | Status: COMPLETED | COMMUNITY
Start: 2020-11-30 | End: 2021-04-28

## 2021-04-28 RX ORDER — METFORMIN HYDROCHLORIDE 1000 MG/1
1000 TABLET, COATED ORAL
Qty: 90 | Refills: 0 | Status: COMPLETED | COMMUNITY
Start: 2017-01-11 | End: 2021-04-28

## 2021-04-28 RX ORDER — SACUBITRIL AND VALSARTAN 97; 103 MG/1; MG/1
97-103 TABLET, FILM COATED ORAL
Qty: 60 | Refills: 0 | Status: COMPLETED | COMMUNITY
Start: 2020-07-09 | End: 2021-04-28

## 2021-08-19 ENCOUNTER — OUTPATIENT (OUTPATIENT)
Dept: OUTPATIENT SERVICES | Facility: HOSPITAL | Age: 86
LOS: 1 days | End: 2021-08-19
Payer: MEDICARE

## 2021-08-19 ENCOUNTER — APPOINTMENT (OUTPATIENT)
Dept: ULTRASOUND IMAGING | Facility: CLINIC | Age: 86
End: 2021-08-19
Payer: MEDICARE

## 2021-08-19 DIAGNOSIS — Z98.49 CATARACT EXTRACTION STATUS, UNSPECIFIED EYE: Chronic | ICD-10-CM

## 2021-08-19 DIAGNOSIS — Z98.890 OTHER SPECIFIED POSTPROCEDURAL STATES: Chronic | ICD-10-CM

## 2021-08-19 DIAGNOSIS — Z98.89 OTHER SPECIFIED POSTPROCEDURAL STATES: Chronic | ICD-10-CM

## 2021-08-19 DIAGNOSIS — Z00.8 ENCOUNTER FOR OTHER GENERAL EXAMINATION: ICD-10-CM

## 2021-08-19 PROCEDURE — 76775 US EXAM ABDO BACK WALL LIM: CPT | Mod: 26

## 2021-08-19 PROCEDURE — 76775 US EXAM ABDO BACK WALL LIM: CPT

## 2021-12-21 ENCOUNTER — INPATIENT (INPATIENT)
Facility: HOSPITAL | Age: 86
LOS: 1 days | Discharge: HOME | DRG: 176 | End: 2021-12-23
Attending: FAMILY MEDICINE | Admitting: EMERGENCY MEDICINE
Payer: MEDICARE

## 2021-12-21 VITALS
DIASTOLIC BLOOD PRESSURE: 63 MMHG | HEART RATE: 90 BPM | HEIGHT: 62 IN | RESPIRATION RATE: 20 BRPM | WEIGHT: 164.91 LBS | SYSTOLIC BLOOD PRESSURE: 142 MMHG | TEMPERATURE: 98 F | OXYGEN SATURATION: 93 %

## 2021-12-21 DIAGNOSIS — I50.9 HEART FAILURE, UNSPECIFIED: ICD-10-CM

## 2021-12-21 DIAGNOSIS — Z98.49 CATARACT EXTRACTION STATUS, UNSPECIFIED EYE: Chronic | ICD-10-CM

## 2021-12-21 DIAGNOSIS — Z98.89 OTHER SPECIFIED POSTPROCEDURAL STATES: Chronic | ICD-10-CM

## 2021-12-21 DIAGNOSIS — Z98.890 OTHER SPECIFIED POSTPROCEDURAL STATES: Chronic | ICD-10-CM

## 2021-12-21 DIAGNOSIS — I26.99 OTHER PULMONARY EMBOLISM WITHOUT ACUTE COR PULMONALE: ICD-10-CM

## 2021-12-21 LAB
ALBUMIN SERPL ELPH-MCNC: 3.2 G/DL — LOW (ref 3.3–5.2)
ALP SERPL-CCNC: 114 U/L — SIGNIFICANT CHANGE UP (ref 40–120)
ALT FLD-CCNC: 10 U/L — SIGNIFICANT CHANGE UP
ANION GAP SERPL CALC-SCNC: 10 MMOL/L — SIGNIFICANT CHANGE UP (ref 5–17)
APPEARANCE UR: ABNORMAL
APTT BLD: 31.1 SEC — SIGNIFICANT CHANGE UP (ref 27.5–35.5)
AST SERPL-CCNC: 18 U/L — SIGNIFICANT CHANGE UP
BACTERIA # UR AUTO: ABNORMAL
BASOPHILS # BLD AUTO: 0.02 K/UL — SIGNIFICANT CHANGE UP (ref 0–0.2)
BASOPHILS NFR BLD AUTO: 0.2 % — SIGNIFICANT CHANGE UP (ref 0–2)
BILIRUB SERPL-MCNC: 0.5 MG/DL — SIGNIFICANT CHANGE UP (ref 0.4–2)
BILIRUB UR-MCNC: NEGATIVE — SIGNIFICANT CHANGE UP
BUN SERPL-MCNC: 11.9 MG/DL — SIGNIFICANT CHANGE UP (ref 8–20)
CALCIUM SERPL-MCNC: 8.6 MG/DL — SIGNIFICANT CHANGE UP (ref 8.6–10.2)
CHLORIDE SERPL-SCNC: 102 MMOL/L — SIGNIFICANT CHANGE UP (ref 98–107)
CO2 SERPL-SCNC: 28 MMOL/L — SIGNIFICANT CHANGE UP (ref 22–29)
COLOR SPEC: YELLOW — SIGNIFICANT CHANGE UP
CREAT SERPL-MCNC: 0.74 MG/DL — SIGNIFICANT CHANGE UP (ref 0.5–1.3)
D DIMER BLD IA.RAPID-MCNC: 2250 NG/ML DDU — HIGH
DIFF PNL FLD: ABNORMAL
EOSINOPHIL # BLD AUTO: 0.19 K/UL — SIGNIFICANT CHANGE UP (ref 0–0.5)
EOSINOPHIL NFR BLD AUTO: 2.2 % — SIGNIFICANT CHANGE UP (ref 0–6)
EPI CELLS # UR: SIGNIFICANT CHANGE UP
FLUAV AG NPH QL: SIGNIFICANT CHANGE UP
FLUBV AG NPH QL: SIGNIFICANT CHANGE UP
GLUCOSE BLDC GLUCOMTR-MCNC: 167 MG/DL — HIGH (ref 70–99)
GLUCOSE SERPL-MCNC: 117 MG/DL — HIGH (ref 70–99)
GLUCOSE UR QL: NEGATIVE MG/DL — SIGNIFICANT CHANGE UP
HCT VFR BLD CALC: 35.4 % — SIGNIFICANT CHANGE UP (ref 34.5–45)
HGB BLD-MCNC: 10.9 G/DL — LOW (ref 11.5–15.5)
HIV 1 & 2 AB SERPL IA.RAPID: SIGNIFICANT CHANGE UP
IMM GRANULOCYTES NFR BLD AUTO: 0.5 % — SIGNIFICANT CHANGE UP (ref 0–1.5)
INR BLD: 1.82 RATIO — HIGH (ref 0.88–1.16)
KETONES UR-MCNC: NEGATIVE — SIGNIFICANT CHANGE UP
LEUKOCYTE ESTERASE UR-ACNC: ABNORMAL
LYMPHOCYTES # BLD AUTO: 1.22 K/UL — SIGNIFICANT CHANGE UP (ref 1–3.3)
LYMPHOCYTES # BLD AUTO: 14.4 % — SIGNIFICANT CHANGE UP (ref 13–44)
MCHC RBC-ENTMCNC: 25.5 PG — LOW (ref 27–34)
MCHC RBC-ENTMCNC: 30.8 GM/DL — LOW (ref 32–36)
MCV RBC AUTO: 82.7 FL — SIGNIFICANT CHANGE UP (ref 80–100)
MONOCYTES # BLD AUTO: 1 K/UL — HIGH (ref 0–0.9)
MONOCYTES NFR BLD AUTO: 11.8 % — SIGNIFICANT CHANGE UP (ref 2–14)
NEUTROPHILS # BLD AUTO: 6.03 K/UL — SIGNIFICANT CHANGE UP (ref 1.8–7.4)
NEUTROPHILS NFR BLD AUTO: 70.9 % — SIGNIFICANT CHANGE UP (ref 43–77)
NITRITE UR-MCNC: NEGATIVE — SIGNIFICANT CHANGE UP
NT-PROBNP SERPL-SCNC: 4830 PG/ML — HIGH (ref 0–300)
PH UR: 6 — SIGNIFICANT CHANGE UP (ref 5–8)
PLATELET # BLD AUTO: 270 K/UL — SIGNIFICANT CHANGE UP (ref 150–400)
POTASSIUM SERPL-MCNC: 4.5 MMOL/L — SIGNIFICANT CHANGE UP (ref 3.5–5.3)
POTASSIUM SERPL-SCNC: 4.5 MMOL/L — SIGNIFICANT CHANGE UP (ref 3.5–5.3)
PROT SERPL-MCNC: 6.6 G/DL — SIGNIFICANT CHANGE UP (ref 6.6–8.7)
PROT UR-MCNC: 30 MG/DL
PROTHROM AB SERPL-ACNC: 20.5 SEC — HIGH (ref 10.6–13.6)
RBC # BLD: 4.28 M/UL — SIGNIFICANT CHANGE UP (ref 3.8–5.2)
RBC # FLD: 15.4 % — HIGH (ref 10.3–14.5)
RBC CASTS # UR COMP ASSIST: SIGNIFICANT CHANGE UP /HPF (ref 0–4)
RSV RNA NPH QL NAA+NON-PROBE: SIGNIFICANT CHANGE UP
SARS-COV-2 RNA SPEC QL NAA+PROBE: SIGNIFICANT CHANGE UP
SODIUM SERPL-SCNC: 139 MMOL/L — SIGNIFICANT CHANGE UP (ref 135–145)
SP GR SPEC: 1.01 — SIGNIFICANT CHANGE UP (ref 1.01–1.02)
TROPONIN T SERPL-MCNC: 0.07 NG/ML — HIGH (ref 0–0.06)
TROPONIN T SERPL-MCNC: 0.08 NG/ML — HIGH (ref 0–0.06)
UROBILINOGEN FLD QL: NEGATIVE MG/DL — SIGNIFICANT CHANGE UP
WBC # BLD: 8.5 K/UL — SIGNIFICANT CHANGE UP (ref 3.8–10.5)
WBC # FLD AUTO: 8.5 K/UL — SIGNIFICANT CHANGE UP (ref 3.8–10.5)
WBC UR QL: ABNORMAL

## 2021-12-21 PROCEDURE — 71250 CT THORAX DX C-: CPT | Mod: 26,MA,59

## 2021-12-21 PROCEDURE — 99285 EMERGENCY DEPT VISIT HI MDM: CPT

## 2021-12-21 PROCEDURE — 71275 CT ANGIOGRAPHY CHEST: CPT | Mod: 26,MA

## 2021-12-21 PROCEDURE — 93010 ELECTROCARDIOGRAM REPORT: CPT | Mod: 76

## 2021-12-21 PROCEDURE — 72131 CT LUMBAR SPINE W/O DYE: CPT | Mod: 26,MA

## 2021-12-21 PROCEDURE — 71045 X-RAY EXAM CHEST 1 VIEW: CPT | Mod: 26

## 2021-12-21 PROCEDURE — 99223 1ST HOSP IP/OBS HIGH 75: CPT

## 2021-12-21 PROCEDURE — 70498 CT ANGIOGRAPHY NECK: CPT | Mod: 26,MA

## 2021-12-21 PROCEDURE — 99497 ADVNCD CARE PLAN 30 MIN: CPT | Mod: 25

## 2021-12-21 PROCEDURE — 70496 CT ANGIOGRAPHY HEAD: CPT | Mod: 26,MA

## 2021-12-21 RX ORDER — DEXTROSE 50 % IN WATER 50 %
25 SYRINGE (ML) INTRAVENOUS ONCE
Refills: 0 | Status: DISCONTINUED | OUTPATIENT
Start: 2021-12-21 | End: 2021-12-23

## 2021-12-21 RX ORDER — DEXTROSE 50 % IN WATER 50 %
15 SYRINGE (ML) INTRAVENOUS ONCE
Refills: 0 | Status: DISCONTINUED | OUTPATIENT
Start: 2021-12-21 | End: 2021-12-23

## 2021-12-21 RX ORDER — METOPROLOL TARTRATE 50 MG
5 TABLET ORAL ONCE
Refills: 0 | Status: COMPLETED | OUTPATIENT
Start: 2021-12-21 | End: 2021-12-21

## 2021-12-21 RX ORDER — ASPIRIN/CALCIUM CARB/MAGNESIUM 324 MG
1 TABLET ORAL
Qty: 0 | Refills: 0 | DISCHARGE

## 2021-12-21 RX ORDER — ATORVASTATIN CALCIUM 80 MG/1
40 TABLET, FILM COATED ORAL DAILY
Refills: 0 | Status: DISCONTINUED | OUTPATIENT
Start: 2021-12-21 | End: 2021-12-23

## 2021-12-21 RX ORDER — SACUBITRIL AND VALSARTAN 24; 26 MG/1; MG/1
1 TABLET, FILM COATED ORAL
Refills: 0 | Status: DISCONTINUED | OUTPATIENT
Start: 2021-12-21 | End: 2021-12-22

## 2021-12-21 RX ORDER — METFORMIN HYDROCHLORIDE 850 MG/1
1 TABLET ORAL
Qty: 0 | Refills: 0 | DISCHARGE

## 2021-12-21 RX ORDER — METOPROLOL TARTRATE 50 MG
100 TABLET ORAL DAILY
Refills: 0 | Status: DISCONTINUED | OUTPATIENT
Start: 2021-12-21 | End: 2021-12-22

## 2021-12-21 RX ORDER — MULTIVIT-MIN/FERROUS GLUCONATE 9 MG/15 ML
1 LIQUID (ML) ORAL
Qty: 0 | Refills: 0 | DISCHARGE

## 2021-12-21 RX ORDER — OXYBUTYNIN CHLORIDE 5 MG
10 TABLET ORAL DAILY
Refills: 0 | Status: DISCONTINUED | OUTPATIENT
Start: 2021-12-21 | End: 2021-12-23

## 2021-12-21 RX ORDER — OXYBUTYNIN CHLORIDE 5 MG
1 TABLET ORAL
Qty: 0 | Refills: 0 | DISCHARGE

## 2021-12-21 RX ORDER — CEFTRIAXONE 500 MG/1
1000 INJECTION, POWDER, FOR SOLUTION INTRAMUSCULAR; INTRAVENOUS ONCE
Refills: 0 | Status: COMPLETED | OUTPATIENT
Start: 2021-12-21 | End: 2021-12-21

## 2021-12-21 RX ORDER — ENOXAPARIN SODIUM 100 MG/ML
75 INJECTION SUBCUTANEOUS EVERY 12 HOURS
Refills: 0 | Status: DISCONTINUED | OUTPATIENT
Start: 2021-12-21 | End: 2021-12-23

## 2021-12-21 RX ORDER — GLUCAGON INJECTION, SOLUTION 0.5 MG/.1ML
1 INJECTION, SOLUTION SUBCUTANEOUS ONCE
Refills: 0 | Status: DISCONTINUED | OUTPATIENT
Start: 2021-12-21 | End: 2021-12-23

## 2021-12-21 RX ORDER — INSULIN LISPRO 100/ML
VIAL (ML) SUBCUTANEOUS
Refills: 0 | Status: DISCONTINUED | OUTPATIENT
Start: 2021-12-21 | End: 2021-12-23

## 2021-12-21 RX ORDER — SODIUM CHLORIDE 9 MG/ML
1000 INJECTION, SOLUTION INTRAVENOUS
Refills: 0 | Status: DISCONTINUED | OUTPATIENT
Start: 2021-12-21 | End: 2021-12-23

## 2021-12-21 RX ORDER — OXYBUTYNIN CHLORIDE 5 MG
10 TABLET ORAL DAILY
Refills: 0 | Status: DISCONTINUED | OUTPATIENT
Start: 2021-12-21 | End: 2021-12-21

## 2021-12-21 RX ORDER — METOPROLOL TARTRATE 50 MG
1 TABLET ORAL
Qty: 0 | Refills: 0 | DISCHARGE

## 2021-12-21 RX ORDER — MECLIZINE HCL 12.5 MG
1 TABLET ORAL
Qty: 0 | Refills: 0 | DISCHARGE

## 2021-12-21 RX ORDER — ATORVASTATIN CALCIUM 80 MG/1
1 TABLET, FILM COATED ORAL
Qty: 0 | Refills: 0 | DISCHARGE

## 2021-12-21 RX ORDER — DEXTROSE 50 % IN WATER 50 %
12.5 SYRINGE (ML) INTRAVENOUS ONCE
Refills: 0 | Status: DISCONTINUED | OUTPATIENT
Start: 2021-12-21 | End: 2021-12-23

## 2021-12-21 RX ORDER — METOPROLOL TARTRATE 50 MG
50 TABLET ORAL ONCE
Refills: 0 | Status: COMPLETED | OUTPATIENT
Start: 2021-12-21 | End: 2021-12-21

## 2021-12-21 RX ADMIN — Medication 5 MILLIGRAM(S): at 22:58

## 2021-12-21 RX ADMIN — Medication 5 MILLIGRAM(S): at 19:30

## 2021-12-21 RX ADMIN — CEFTRIAXONE 100 MILLIGRAM(S): 500 INJECTION, POWDER, FOR SOLUTION INTRAMUSCULAR; INTRAVENOUS at 18:27

## 2021-12-21 RX ADMIN — Medication 50 MILLIGRAM(S): at 20:11

## 2021-12-21 RX ADMIN — Medication 5 MILLIGRAM(S): at 19:40

## 2021-12-21 NOTE — ED PROVIDER NOTE - OBJECTIVE STATEMENT
86 yo female pmh chf, cva , chronic dizziness and back pain comes to ed with difficulty getting off the toilet this morning; pt  noted  feeling dizzy this am when she woke up ; pt noted over a couple of weeks with lower back pain and weakness of her left lower leg;  pt denies any recent trauma, chest pain or abdominal pain

## 2021-12-21 NOTE — ED PROVIDER NOTE - GASTROINTESTINAL NEGATIVE STATEMENT, MLM
This note was copied from the mother's chart.  Pt called to have a feeding observed. Pt states that the feedings have improved, but this morning she could tell that he did not have a good latch. She took him off, her nipple was creased, reattached him, and he nursed well. At this feeding, infant is very high on her breast, was pulling at her nipple, and had his bottom lip sucked in.  Infant was detached and pulled down, repositioned. Infant latched better, but continues to suck his bottom lip in. This improved with some adjustment. Encouraged pt to monitor this. Discharge teaching completed, questions answered and pt verbalizes understanding.    no abdominal pain, no bloating, no constipation, no diarrhea, no nausea and no vomiting.

## 2021-12-21 NOTE — H&P ADULT - NSICDXPASTSURGICALHX_GEN_ALL_CORE_FT
PAST SURGICAL HISTORY:  H/O cataract extraction bilateral    H/O foot surgery left  foot  hammer toe  and bunion repair    History of coronary angiogram feb 2018    History of tonsillectomy

## 2021-12-21 NOTE — ED ADULT NURSE REASSESSMENT NOTE - NS ED NURSE REASSESS COMMENT FT1
HR NOTED 179 ON CARDIAC MONITOR MD PEDROZA MADE AWARE STAT EKG DONE BY PCT JUDI, ENDORSED TO ONCOMING NURSE

## 2021-12-21 NOTE — ED PROVIDER NOTE - PHYSICAL EXAMINATION
Alert, lucid, and in no apparent distress. Pt is normocephalic, atraumatic.  Pupils are equal, round, lips pink, moist mucous membranes, tongue midline. Neck supple.   Lungs clear to auscultation. Heart regular rate and rhythm, normal S1, S2,  Abdomen is soft, nontender, no pulsatile mass, no masses, no distension,   Non-focal sensory, 5 out of 5 motor strength, no dysmetria, fluent, goal directed speech. CN2 to 12 intact. Skin without rash,  Normal mentation, does not appear agitated

## 2021-12-21 NOTE — H&P ADULT - ASSESSMENT
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  - Oncology consult  - CTS/IR  - Will consult Palliative for assistance w/ GOC    Pre-syncope, dizziness  - Cardiology following  - CTH negative  - Cardiac monitoring  - D-dimer positive, CTA official read pending  - PT consult    Elevated troponin  - Denies chest pain  - Trend troponin  - Cycle EKG  - Cardiac monitoring    Hx of CHFrEF, PAfib, HTN, HLD  - Continue with home medications    DM2  - FS, sliding scale  - Diabetic diet       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  - Oncology consult  - CTS/IR for biopsy  - Will consult Palliative for assistance w/ GOC    Pre-syncope, dizziness  - Cardiology following  - CTH negative  - Cardiac monitoring  - D-dimer positive, CTA official read pending  - PT consult    Elevated troponin  - Denies chest pain  - Trend troponin  - Cycle EKG  - Cardiac monitoring    Hx of CHFrEF, PAfib, HTN, HLD  - Continue with home medications    DM2  - FS, sliding scale  - Diabetic diet    Advanced Directives: DNR/DNI     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  - Oncology consult  - CTS/IR for biopsy  - Will consult Palliative for assistance w/ GOC    Pre-syncope, dizziness  - Cardiology following  - CTH negative  - Cardiac monitoring  - D-dimer positive, CTA official read pending  - PT consult    Elevated troponin  - Denies chest pain  - Trend troponin  - Cycle EKG  - Cardiac monitoring    Hx of CHFrEF, PAfib, HTN, HLD  - Continue with home medications    DM2  - FS, sliding scale  - Diabetic diet    Infrarenal aneurysm  - Increased since 2018  - Vascular consult    Advanced Directives: DNR/DNI     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  - Oncology consult  - CTS/IR for biopsy  - Will consult Palliative for assistance w/ GOC    Pre-syncope, dizziness  - Cardiology and Neurology following  - CTH negative  - Cardiac monitoring  - D-dimer positive, CTA official read pending  - Meclizine prn  - PT consult    Elevated troponin  - Denies chest pain  - Trend troponin  - Cycle EKG  - Cardiac monitoring    Hx of CHFrEF, PAfib, HTN, HLD  - Continue with home medications    DM2  - FS, sliding scale  - Diabetic diet    Infrarenal aneurysm  - Increased since 2018  - Vascular consult    Advanced Directives: DNR/DNI     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  - Oncology consult  - CTS/IR for biopsy  - Will consult Palliative for assistance w/ GOC    Pre-syncope, dizziness  - Cardiology and Neurology following  - CTH negative  - Cardiac monitoring  - D-dimer positive, CTA official read pending  - Meclizine prn  - PT consult    Elevated troponin  - Denies chest pain  - Trend troponin  - Cycle EKG  - Cardiac monitoring    Hx of CHFrEF, PAfib, HTN, HLD  - Continue with home medications    DM2  - FS, sliding scale  - Diabetic diet    Infrarenal aneurysm  - Increased since 2018  - Vascular consult    Advanced Directives: DNR/DNI, Molst form completed   Patient is a 88 yo F w/ PMH of HFrEF, pAFib, HTN, HLD, Macular degeneration, former smoker, DM2 presenting with chief complaint of dizziness. Found to have RLL mass on CT with skeletal metastasis.     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  - Oncology consult  - CTS/IR for biopsy  - Will consult Palliative for assistance w/ GOC    Pre-syncope, dizziness  - Cardiology and Neurology following  - CTH negative  - Cardiac monitoring  - D-dimer positive, CTA official read pending  - Meclizine prn  - PT consult    Elevated troponin  - Denies chest pain  - Trend troponin  - Cycle EKG  - Cardiac monitoring  - Cardio following    Hx of CHFrEF, PAfib, HTN, HLD  - Continue with home medications    DM2  - FS, sliding scale  - Diabetic diet    Infrarenal aneurysm  - Increased since 2018  - Vascular consult    Advanced Directives: DNR/DNI, Molst form completed Patient is a 86 yo F w/ PMH of HFrEF, pAFib, HTN, HLD, Macular degeneration, former smoker, DM2 presenting with chief complaint of dizziness. Found to have RLL mass on CT with skeletal metastasis.     RLL suspicious for lung CAw/ metastases  - Patient with 20lb weight loss in past 6 months, former heavy smoker.  - Oncology consult in AM  - Will call CTS/IR for biopsy in AM  - Patient unsure if she would want chemotherapy, however is willing for workup.    Pre-syncope, dizziness  - Cardiology and Neurology following  - CTH negative  - Cardiac monitoring  - D-dimer positive, CTA official read pending  - Meclizine prn  - PT consult    Elevated troponin  - Denies chest pain  - Trend troponin  - Cycle EKG  - Cardiac monitoring  - Cardio following    Hx of CHFrEF, PAfib, HTN, HLD  - Continue with home medications    DM2  - FS, sliding scale  - Diabetic diet    Infrarenal aneurysm  - Increased since 2018  - Vascular consult    Advanced Directives: DNR/DNI, Molst form completed

## 2021-12-21 NOTE — CONSULT NOTE ADULT - SUBJECTIVE AND OBJECTIVE BOX
formerly [facility], [facility]                                   [address] [place] [address]                                                      PHONE: [phone]                                                         FAX: [phone]  [email or web address]  ---------------------------------------------------------------------------------------------------------------------------------    Reason for Consult: weakness    HPI:  ELAN MOSS is an 87y Female with NICM LVEF 45%, paroxysmal atrial fibrillation on AC, HTN, HLD presented today with extreme weakness. She says she was unable to get up this morning. She started not feeling well last night. She denies chest pain or SOB. She denies syncope.     PAST MEDICAL & SURGICAL HISTORY:  Hypertension    Diabetes    High cholesterol    Overactive bladder    Atrial fibrillation    Abdominal aortic aneurysm  5.5 cm AAA    Aortic valve insufficiency    CHF (congestive heart failure), NYHA class II, chronic, combined    LV dysfunction    Ejection fraction &lt; 50%  35% on 2018 echo    Ascending aortic aneurysm  4.6 cm    TIA (transient ischemic attack)  2017 [facility]    Pulmonary HTN    Heart failure, NYHA class 2  chronic combined    LV dysfunction    SOB (shortness of breath)    Edema of both ankles  trace    Former smoker    Thyroid enlarged  Followed by Dr. Brian (endocrinologist)    Stress at home   with bladder cancer    Bladder incontinence    Diastolic dysfunction with heart failure  grade II    Right ventricular dysfunction    Right and left atrial hypertrophy    Severe aortic regurgitation    Mitral valve regurgitation    Tricuspid regurgitation  moderate    Pleural effusion  tanner moderate    H/O cataract extraction  bilateral    H/O foot surgery  left  foot  hammer toe  and bunion repair    History of coronary angiogram  feb 2018    History of tonsillectomy        No Known Allergies      MEDICATIONS  (STANDING):    MEDICATIONS  (PRN):      Social History:  Cigarettes:  former      Family History:  denies CAD, MI, CVA, or sudden death.    ROS: Negative other than as mentioned in HPI.    Vital Signs Last 24 Hrs  T(C): 36.5 (21 Dec 2021 09:37), Max: 36.5 (21 Dec 2021 09:37)  T(F): 97.7 (21 Dec 2021 09:37), Max: 97.7 (21 Dec 2021 09:37)  HR: 90 (21 Dec 2021 09:37) (90 - 90)  BP: 142/63 (21 Dec 2021 09:37) (142/63 - 142/63)  BP(mean): --  RR: 20 (21 Dec 2021 09:37) (20 - 20)  SpO2: 93% (21 Dec 2021 09:37) (93% - 93%)  ICU Vital Signs Last 24 Hrs  ELAN MOSS  I&O's Detail    I&O's Summary    Drug Dosing Weight  ELAN MOSS      PHYSICAL EXAM:  General: NAD  HEENT: Head; normocephalic, atraumatic.  Eyes: Pupils reactive, cornea wnl.  Neck: Supple, no nodes adenopathy, no NVD or carotid bruit or thyromegaly.  CARDIOVASCULAR: Normal S1 and S2, No murmur, rub, gallop or lift.   LUNGS: No rales, rhonchi or wheeze. Normal breath sounds bilaterally.  ABDOMEN: Soft, nontender without mass or organomegaly. bowel sounds normoactive.  EXTREMITIES: No clubbing, cyanosis or edema. Distal pulses wnl.   SKIN: warm and dry with normal turgor.  NEURO: Alert/oriented x 3  PSYCH: normal affect.        LABS:                        10.9   8.50  )-----------( 270      ( 21 Dec 2021 10:32 )             35.4     12-21    139  |  102  |  11.9  ----------------------------<  117<H>  4.5   |  28.0  |  0.74    Ca    8.6      21 Dec 2021 10:32    TPro  6.6  /  Alb  3.2<L>  /  TBili  0.5  /  DBili  x   /  AST  18  /  ALT  10  /  AlkPhos  114  12-21    [facility]  CARDIAC MARKERS ( 21 Dec 2021 10:32 )  x     / 0.07 ng/mL / x     / x     / x          PT/INR - ( 21 Dec 2021 10:32 )   PT: 20.5 sec;   INR: 1.82 ratio         PTT - ( 21 Dec 2021 10:32 )  PTT:31.1 sec      RADIOLOGY & ADDITIONAL STUDIES:    INTERPRETATION OF TELEMETRY (personally reviewed) sinus rhythm     ECG:    ECHO 10/26/20  LVEF 40-45%.  mild to moderate MR.  Moderate AI.  Ascending aorta 4.4 cm.    CARDIAC CATHETERIZATION 2018  mild nonobstructive CAD.     Assessment and Plan:  In summary, ELAN MOSS is an 87y Female with past medical history significant for NICM LVEF 45%, paroxysmal atrial fibrillation on AC, HTN, HLD presented today with extreme weakness. She says she was unable to get up this morning. She started not feeling well last night. She denies chest pain or SOB. She denies syncope.     - unclear etiology of the patient's symptoms. There is no clinical evidence of heart failure at this time despite the elevated BNP. She is breathing comfortably laying flat on room air without peripheral edema.  - viral panel negative  - CXR and CT head pending  - monitor on telemetry    Will follow with you.

## 2021-12-21 NOTE — H&P ADULT - NSHPPHYSICALEXAM_GEN_ALL_CORE
Vital Signs Last 24 Hrs  T(F): 97.7 (21 Dec 2021 09:37), Max: 97.7 (21 Dec 2021 09:37)  HR: 90 (21 Dec 2021 09:37) (90 - 90)  BP: 142/63 (21 Dec 2021 09:37) (142/63 - 142/63)  RR: 20 (21 Dec 2021 09:37) (20 - 20)  SpO2: 93% (21 Dec 2021 09:37) (93% - 93%)    Physical Exam:  Constitutional: Elderly female, pleasant, NAD  HEENT: NC/AT, PERRL, EOMI, trachea midline, no JVD  Respiratory: CTA bilaterally, symmetrical chest rise  Cardiovascular: RRR, no m/g/r  Gastrointestinal: Soft, NT/ND, BS+  Vascular: 2+ peripheral pulses  Neurological: A/O x 3, no focal neurological deficits  Psych: Fair mood/affect  Musculoskeletal: No edema, cyanosis, deformities. ROM normal  Skin: No obvious rash, lesions. No jaundice.

## 2021-12-21 NOTE — ED ADULT TRIAGE NOTE - CHIEF COMPLAINT QUOTE
pt a+xo3, BIBA from home c/o worsening LE weakness, dizziness and lower back pain. pt states she went to bathroom this morning, was unable to get up off toilet so she lowered self to floor. reports hx of chronic dizziness and lower back pain.

## 2021-12-21 NOTE — ED PROVIDER NOTE - NS_BEDUNITTYPES_ED_ALL_ED
Patient ID: Sarah Beth is a 55 year old female.    Chief Complaint   Patient presents with   • Follow-up       HPI  55-year-old woman with history of severe asthma, on chronic prednisone in the past, failed multiple controller inhalers and ultimately underwent  thermal ablation of the airways, STEVEN, TMJ, SVT, multiple thyroid nodules, diabetes and chronic sinusitis presents for a follow-up.  Patient's fasting blood sugars have ranged 100-110s she is doing good on a calorie restricted diet, hemoglobin A1c in the fall was 6.7%.  Metformin seem to cause weight gain and her in the past, and glipizide caused nausea.  Continues on Trulicity 0.75 mg weekly  Has lost 6 pounds doing a 90-day planned, plans to repeat the cycle in the summertime.  With regards to her asthma, has recent fascenra treatment.  Seems to have worked for the allergy season, better this year than in years past  She is on CPAP for treatment of obstructive sleep apnea.    Current Outpatient Medications   Medication Sig Dispense Refill   • methylphenidate (RITALIN) 20 MG tablet Take 1 tablet by mouth every morning. 30 tablet 0   • cyclobenzaprine (FLEXERIL) 5 MG tablet Take 1 tablet by mouth daily. 90 tablet 0   • hydroCORTisone (ANUSOL-HC) 25 MG suppository Place 1 suppository rectally 2 times daily. 30 suppository 3   • VITAMIN D, CHOLECALCIFEROL, PO      • benRALIzumab (Fasenra) 30 MG/ML Solution Prefilled Syringe Inject 1 syringe into the skin every 8 weeks 1 mL 6   • methenamine (MANDELAMINE) 1 g tablet Take 1 tablet by mouth daily. 30 tablet 11   • dulaglutide (TRULICITY) 0.75 MG/0.5ML pen-injector Inject 0.75 mg into the skin 1 day a week. 2 mL 11   • omeprazole (PriLOSEC) 40 MG capsule Take 1 capsule by mouth daily. 90 capsule 3   • sodium bicarbonate 650 MG tablet Take 1 tablet by mouth 4 times daily. 360 tablet 3   • ciclopirox 0.77 % gel Apply topically 2 times daily. 30 g 0   • verapamil 180 MG 24 hr capsule Take 1 capsule by mouth daily. 90  capsule 1   • verapamil (CALAN SR) 180 MG CR tablet Take 1 tablet by mouth nightly. 90 tablet 2   • spironolactone (ALDACTONE) 100 MG tablet TAKE 1 TABLET BY MOUTH  DAILY 90 tablet 4   • atorvastatin (LIPITOR) 20 MG tablet TAKE 1 TABLET  BY  MOUTH DAILY 90 tablet 4   • hydrochlorothiazide (HYDRODIURIL) 25 MG tablet TAKE 1 TABLET BY MOUTH DAILY 90 tablet 4   • sertraline (ZOLOFT) 50 MG tablet TAKE 1 TABLET BY MOUTH DAILY 90 tablet 4   • celecoxib (CeleBREX) 200 MG capsule Take 1 capsule by mouth daily. 90 capsule 4   • lisinopril (ZESTRIL) 20 MG tablet Take 1 tablet by mouth daily. 90 tablet 4   • lidocaine (LIDODERM) 5 % patch Place 1 patch onto the skin every 24 hours. Leave in place for 12 hours, remove, then leave off for 12 hours before applying a new patch. 90 patch 3   • Lancets (onetouch ultrasoft) Misc TEST ONCE DAILY 100 each 0   • blood glucose (FREESTYLE LITE) test strip Test blood sugar 1 time daily as directed. 100 each 3   • Blood Glucose Monitoring Suppl (FreeStyle Lite) Device Use to test blood sugars 1 each 0   • Lancets (freestyle) Misc Use to test once daily 100 each 3   • omeprazole-sodium bicarbonate (ZEGERID)  MG per capsule Take 1 capsule by mouth daily. 90 capsule 4   • levoFLOXacin (LEVAQUIN) 500 MG tablet TK 1 T PO D     • olopatadine (PATANASE) 0.6 % nasal spray Instill 2 sprays into each nostrils twice daily. 91.5 g 1   • mometasone (NASONEX) 50 MCG/ACT nasal spray Spray 2 sprays in each nostril 2 (two) times a day. 102 g 3   • beclomethasone diprop HFA (QVAR REDIHALER) 80 MCG/ACT inhaler Inhale 2 puffs into the lungs 2 times daily. 31.8 g 3   • montelukast (SINGULAIR) 10 MG tablet Take 1 tablet by mouth daily. 90 tablet 3   • budesonide-formoterol (Symbicort) 160-4.5 MCG/ACT inhaler Inhale 2 puffs into the lungs 2 times daily. 30.6 g 3   • methscopolamine (PAMINE) 2.5 MG Tab TK ONE T PO BID  1   • phenazopyridine (PYRIDIUM) 200 MG tablet Take 200 mg by mouth.     • olopatadine  (PATANOL) 0.1 % ophthalmic solution 1 drop.     • Magnesium 400 MG Tab      • albuterol-ipratropium 2.5 mg/0.5 mg (DUONEB) 0.5-2.5 (3) MG/3ML nebulizer solution Inhale 3 mLs into the lungs.     • budesonide (PULMICORT) 1 MG/2ML nebulizer suspension      • albuterol 108 (90 Base) MCG/ACT inhaler Inhale 2 puffs into the lungs.     • cyanocobalamin (VITAMIN B-12) 100 MCG tablet Take by mouth daily.     • biotin 10 MG tablet      • levocetirizine (XYZAL) 5 MG tablet Take 1 tablet by mouth 2 times daily. 60 tablet 11   • famotidine (PEPCID) 20 MG tablet Take 1 tablet by mouth daily. 30 tablet 11   • blood glucose (ONE TOUCH ULTRA TEST) test strip TEST ONCE A DAY       No current facility-administered medications for this visit.       Patient Active Problem List   Diagnosis   • Chronic sinusitis   • Diabetes mellitus type 2, uncomplicated (CMS/HCC)   • Environmental allergies   • Hypertension   • Inflammatory arthritis   • Multiple thyroid nodules   • STEVEN (obstructive sleep apnea)   • Pure hypercholesterolemia   • Retinal detachment   • Severe asthma   • SVT (supraventricular tachycardia) (CMS/HCC)   • TMJ arthralgia   • Nonfunctioning kidney   • Thyroid nodule   • Abnormal findings on diagnostic imaging of breast       Past Medical History:   Diagnosis Date   • Chronic sinusitis    • Diabetes mellitus type 2, uncomplicated (CMS/HCC)    • Environmental allergies    • Hypertension    • Inflammatory arthritis     Possibly scleroderma   • Multiple thyroid nodules    • Nonfunctioning kidney     Left side 30%   • STEVEN (obstructive sleep apnea)     uses CPAP   • Pure hypercholesterolemia    • Retinal detachment    • Severe asthma     s/p thermal ablation, was on chronic prednisone in the past, failed multiple controller therapies   • SVT (supraventricular tachycardia) (CMS/HCC)    • TMJ arthralgia     With trigeminal neuralgia       Past Surgical History:   Procedure Laterality Date   • Appendectomy     • Bladder suspension   2016   • Breast surgery     • Dilation and curettage of uterus      x3   • Endometrial ablation     • Eye surgery     • Lung surgery      Thermal ablation   • Pelvic laparoscopy     • Sinus surgery     • Thyroid biopsy     • Tonsillectomy         Social History     Tobacco Use   • Smoking status: Never Smoker   • Smokeless tobacco: Never Used   Substance Use Topics   • Alcohol use: Never   • Drug use: Never     RN, , 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    Family History   Problem Relation Age of Onset   • Asthma Mother    • Migraine Mother    • Osteoporosis Mother    • Cancer Mother         glioblastoma/NHL   • Asthma Father    • Other Father    • Cancer Father         Lieomyo Sarcoma   • Myocardial Infarction Maternal Grandmother    • Cancer Maternal Grandmother 45        Bilateral breast cancer   • Dementia/Alzheimers Paternal Grandmother    • Depression Paternal Grandfather    • Asthma Daughter    • Autoimmune Disease Daughter    • Asthma Daughter    • Autoimmune Disease Daughter        Review of Systems    Constitutional:          General Pt denies fever, chills, fatigue, and weight change  HEENT/Neck:          no Loss of hearing.  no Post-nasal drip, or rhinitis.  no Sore throat  Respiratory:          Patient denies persistent cough, SOB, wheezing or dyspnea on exertion  Cardiovascular:          Patient denies CP, palpitations, syncope or orthopnea      Visit Vitals  /67 (BP Location: LUE - Left upper extremity, Patient Position: Sitting, Cuff Size: Large Adult)   Pulse 80   Temp 95.8 °F (35.4 °C) (Temporal)   Ht 5' 5\" (1.651 m)   Wt 91.8 kg (202 lb 6.1 oz)   SpO2 98%   BMI 33.68 kg/m²       Physical Exam    GENERAL APPEARANCE: Well appearing, no acute distress.    HEENT: Normal appearing conjunctiva, Nasal exam is within normal limits, There is no pharyngeal erythema. TMs are visualized and are normal.    NECK/THYROID: Neck is supple without cervical lymphadenopathy, No throid masses or tenderness.     CARDIOVASCULAR: regular rate and rhythm, normal S1S2, no murmurs.    RESPIRATORY: clear to auscultation and percussion, there is no wheezing.    ABDOMEN: soft, non-tender, non-distended, +Bowel sounds, No hepatomegaly.  LYMPHATIC: No Lymphadenopathy   EXT: no edema      No problem-specific Assessment & Plan notes found for this encounter.      1. Type 2 diabetes mellitus without complication, without long-term current use of insulin (CMS/HCC)    2. Severe persistent asthma without complication    3. Polyarthritis    4. STEVEN (obstructive sleep apnea)        Sarah Beth was seen today for follow-up.    Diagnoses and all orders for this visit:    Type 2 diabetes mellitus without complication, without long-term current use of insulin (CMS/HCC)  -     COMPREHENSIVE METABOLIC PANEL  -     CBC WITH DIFFERENTIAL  -     THYROID STIMULATING HORMONE REFLEX  -     LIPID PANEL WITHOUT REFLEX  -     MICROALBUMIN URINE RANDOM  -     GLYCOHEMOGLOBIN    Severe persistent asthma without complication  -     SEDIMENTATION RATE WESTERGREN    Polyarthritis  -     ANTI NUCLEAR ANTIBODY COMPREHENSIVE PANEL    STEVEN (obstructive sleep apnea)      Fasting labs as above, recommended regular physical activity in the patient's routine in order to protect her cardiovascular health.  We will repeat the patient's JULIAN panel, scl70 + antibody in the past, not diagnosed with scleroderma, no other symptoms besides polyarthritis.  Continue calorie restricted diet, and weight loss routine.  This will prevent diabetes from progressing.  Continue Trulicity, could be a candidate for low-dose SGLT2 inhibitor, such as Jardiance 10 mg if hemoglobin A1c is worse.  Doing wellon CPAP with obstructive sleep apnea.      Josue Yoo, DO     TELEMETRY

## 2021-12-21 NOTE — H&P ADULT - NSICDXPASTMEDICALHX_GEN_ALL_CORE_FT
PAST MEDICAL HISTORY:  Abdominal aortic aneurysm 5.5 cm AAA    Aortic valve insufficiency     Ascending aortic aneurysm 4.6 cm    Atrial fibrillation     Bladder incontinence     CHF (congestive heart failure), NYHA class II, chronic, combined     Diabetes     Diastolic dysfunction with heart failure grade II    Edema of both ankles trace    Ejection fraction < 50% 35% on 2018 echo    Former smoker     Heart failure, NYHA class 2 chronic combined    High cholesterol     Hypertension     LV dysfunction     LV dysfunction     Mitral valve regurgitation     Overactive bladder     Pleural effusion tanner moderate    Pulmonary HTN     Right and left atrial hypertrophy     Right ventricular dysfunction     Severe aortic regurgitation     SOB (shortness of breath)     Stress at home  with bladder cancer    Thyroid enlarged Followed by Dr. Brian (endocrinologist)    TIA (transient ischemic attack) 2017 SSH    Tricuspid regurgitation moderate

## 2021-12-21 NOTE — ED PROVIDER NOTE - CARE PLAN
1 Principal Discharge DX:	Dizziness  Secondary Diagnosis:	Back pain   Principal Discharge DX:	Acute CHF  Secondary Diagnosis:	Back pain  Secondary Diagnosis:	Pulmonary embolism

## 2021-12-21 NOTE — H&P ADULT - HISTORY OF PRESENT ILLNESS
Patient is a 86 yo F w/ PMH of HFrEF, pAFib, HTN, HLD, Macular degeneration, former smoker, DM2 presenting with chief complaint of dizziness. Patient states she was at home today and started feeling symptoms of dizziness and increased generalized weakness. She was trying to get up from a chair and was unable to do so. She denies LOC. She states she has had intermittent dizziness for a while, described as a room spinning sensation.  She also endorses 20 lb weight loss in the past 6 months. She is a former smoker.    She denies fever, chills, chest pain, cough, sob, palpitations, orthopnea/PND, abd pain, nausea, vomiting, diarrhea, constipation.

## 2021-12-22 LAB
A1C WITH ESTIMATED AVERAGE GLUCOSE RESULT: 6.3 % — HIGH (ref 4–5.6)
ALBUMIN SERPL ELPH-MCNC: 2.8 G/DL — LOW (ref 3.3–5.2)
ALP SERPL-CCNC: 119 U/L — SIGNIFICANT CHANGE UP (ref 40–120)
ALT FLD-CCNC: 9 U/L — SIGNIFICANT CHANGE UP
ANION GAP SERPL CALC-SCNC: 16 MMOL/L — SIGNIFICANT CHANGE UP (ref 5–17)
APTT BLD: 30.8 SEC — SIGNIFICANT CHANGE UP (ref 27.5–35.5)
AST SERPL-CCNC: 19 U/L — SIGNIFICANT CHANGE UP
BILIRUB SERPL-MCNC: 0.5 MG/DL — SIGNIFICANT CHANGE UP (ref 0.4–2)
BUN SERPL-MCNC: 9.8 MG/DL — SIGNIFICANT CHANGE UP (ref 8–20)
CALCIUM SERPL-MCNC: 9 MG/DL — SIGNIFICANT CHANGE UP (ref 8.6–10.2)
CHLORIDE SERPL-SCNC: 100 MMOL/L — SIGNIFICANT CHANGE UP (ref 98–107)
CO2 SERPL-SCNC: 23 MMOL/L — SIGNIFICANT CHANGE UP (ref 22–29)
CREAT SERPL-MCNC: 0.68 MG/DL — SIGNIFICANT CHANGE UP (ref 0.5–1.3)
ESTIMATED AVERAGE GLUCOSE: 134 MG/DL — HIGH (ref 68–114)
GLUCOSE BLDC GLUCOMTR-MCNC: 102 MG/DL — HIGH (ref 70–99)
GLUCOSE BLDC GLUCOMTR-MCNC: 118 MG/DL — HIGH (ref 70–99)
GLUCOSE BLDC GLUCOMTR-MCNC: 129 MG/DL — HIGH (ref 70–99)
GLUCOSE BLDC GLUCOMTR-MCNC: 137 MG/DL — HIGH (ref 70–99)
GLUCOSE SERPL-MCNC: 102 MG/DL — HIGH (ref 70–99)
HCT VFR BLD CALC: 34.1 % — LOW (ref 34.5–45)
HGB BLD-MCNC: 10.7 G/DL — LOW (ref 11.5–15.5)
INR BLD: 1.57 RATIO — HIGH (ref 0.88–1.16)
MAGNESIUM SERPL-MCNC: 1.8 MG/DL — SIGNIFICANT CHANGE UP (ref 1.6–2.6)
MCHC RBC-ENTMCNC: 25.7 PG — LOW (ref 27–34)
MCHC RBC-ENTMCNC: 31.4 GM/DL — LOW (ref 32–36)
MCV RBC AUTO: 82 FL — SIGNIFICANT CHANGE UP (ref 80–100)
PHOSPHATE SERPL-MCNC: 3.5 MG/DL — SIGNIFICANT CHANGE UP (ref 2.4–4.7)
PLATELET # BLD AUTO: 276 K/UL — SIGNIFICANT CHANGE UP (ref 150–400)
POTASSIUM SERPL-MCNC: 4.1 MMOL/L — SIGNIFICANT CHANGE UP (ref 3.5–5.3)
POTASSIUM SERPL-SCNC: 4.1 MMOL/L — SIGNIFICANT CHANGE UP (ref 3.5–5.3)
PROT SERPL-MCNC: 6.4 G/DL — LOW (ref 6.6–8.7)
PROTHROM AB SERPL-ACNC: 17.8 SEC — HIGH (ref 10.6–13.6)
RBC # BLD: 4.16 M/UL — SIGNIFICANT CHANGE UP (ref 3.8–5.2)
RBC # FLD: 15.6 % — HIGH (ref 10.3–14.5)
SODIUM SERPL-SCNC: 139 MMOL/L — SIGNIFICANT CHANGE UP (ref 135–145)
TROPONIN T SERPL-MCNC: 0.1 NG/ML — HIGH (ref 0–0.06)
WBC # BLD: 9.62 K/UL — SIGNIFICANT CHANGE UP (ref 3.8–10.5)
WBC # FLD AUTO: 9.62 K/UL — SIGNIFICANT CHANGE UP (ref 3.8–10.5)

## 2021-12-22 PROCEDURE — 99223 1ST HOSP IP/OBS HIGH 75: CPT

## 2021-12-22 PROCEDURE — 99233 SBSQ HOSP IP/OBS HIGH 50: CPT

## 2021-12-22 RX ORDER — LIDOCAINE 4 G/100G
1 CREAM TOPICAL EVERY 24 HOURS
Refills: 0 | Status: DISCONTINUED | OUTPATIENT
Start: 2021-12-22 | End: 2021-12-23

## 2021-12-22 RX ORDER — ONDANSETRON 8 MG/1
4 TABLET, FILM COATED ORAL EVERY 6 HOURS
Refills: 0 | Status: DISCONTINUED | OUTPATIENT
Start: 2021-12-22 | End: 2021-12-23

## 2021-12-22 RX ORDER — OXYCODONE HYDROCHLORIDE 5 MG/1
5 TABLET ORAL EVERY 6 HOURS
Refills: 0 | Status: DISCONTINUED | OUTPATIENT
Start: 2021-12-22 | End: 2021-12-23

## 2021-12-22 RX ORDER — DILTIAZEM HCL 120 MG
5 CAPSULE, EXT RELEASE 24 HR ORAL ONCE
Refills: 0 | Status: COMPLETED | OUTPATIENT
Start: 2021-12-22 | End: 2021-12-22

## 2021-12-22 RX ORDER — ACETAMINOPHEN 500 MG
650 TABLET ORAL EVERY 6 HOURS
Refills: 0 | Status: DISCONTINUED | OUTPATIENT
Start: 2021-12-22 | End: 2021-12-22

## 2021-12-22 RX ORDER — DILTIAZEM HCL 120 MG
10 CAPSULE, EXT RELEASE 24 HR ORAL EVERY 6 HOURS
Refills: 0 | Status: DISCONTINUED | OUTPATIENT
Start: 2021-12-22 | End: 2021-12-23

## 2021-12-22 RX ORDER — ACETAMINOPHEN 500 MG
650 TABLET ORAL EVERY 6 HOURS
Refills: 0 | Status: DISCONTINUED | OUTPATIENT
Start: 2021-12-22 | End: 2021-12-23

## 2021-12-22 RX ORDER — METOPROLOL TARTRATE 50 MG
50 TABLET ORAL EVERY 6 HOURS
Refills: 0 | Status: DISCONTINUED | OUTPATIENT
Start: 2021-12-22 | End: 2021-12-23

## 2021-12-22 RX ORDER — INFLUENZA VIRUS VACCINE 15; 15; 15; 15 UG/.5ML; UG/.5ML; UG/.5ML; UG/.5ML
0.7 SUSPENSION INTRAMUSCULAR ONCE
Refills: 0 | Status: DISCONTINUED | OUTPATIENT
Start: 2021-12-22 | End: 2021-12-23

## 2021-12-22 RX ORDER — DILTIAZEM HCL 120 MG
60 CAPSULE, EXT RELEASE 24 HR ORAL EVERY 8 HOURS
Refills: 0 | Status: DISCONTINUED | OUTPATIENT
Start: 2021-12-22 | End: 2021-12-23

## 2021-12-22 RX ORDER — OXYCODONE HYDROCHLORIDE 5 MG/1
10 TABLET ORAL EVERY 6 HOURS
Refills: 0 | Status: DISCONTINUED | OUTPATIENT
Start: 2021-12-22 | End: 2021-12-23

## 2021-12-22 RX ADMIN — Medication 10 MILLIGRAM(S): at 11:13

## 2021-12-22 RX ADMIN — Medication 60 MILLIGRAM(S): at 15:59

## 2021-12-22 RX ADMIN — Medication 10 MILLIGRAM(S): at 08:29

## 2021-12-22 RX ADMIN — Medication 100 MILLIGRAM(S): at 08:29

## 2021-12-22 RX ADMIN — ENOXAPARIN SODIUM 75 MILLIGRAM(S): 100 INJECTION SUBCUTANEOUS at 19:42

## 2021-12-22 RX ADMIN — Medication 60 MILLIGRAM(S): at 11:13

## 2021-12-22 RX ADMIN — Medication 50 MILLIGRAM(S): at 17:01

## 2021-12-22 RX ADMIN — Medication 5 MILLIGRAM(S): at 00:53

## 2021-12-22 RX ADMIN — Medication 50 MILLIGRAM(S): at 11:13

## 2021-12-22 RX ADMIN — Medication 5 MILLIGRAM(S): at 02:52

## 2021-12-22 RX ADMIN — Medication 50 MILLIGRAM(S): at 23:52

## 2021-12-22 RX ADMIN — Medication 60 MILLIGRAM(S): at 21:58

## 2021-12-22 RX ADMIN — ENOXAPARIN SODIUM 75 MILLIGRAM(S): 100 INJECTION SUBCUTANEOUS at 08:29

## 2021-12-22 RX ADMIN — SACUBITRIL AND VALSARTAN 1 TABLET(S): 24; 26 TABLET, FILM COATED ORAL at 08:29

## 2021-12-22 RX ADMIN — ATORVASTATIN CALCIUM 40 MILLIGRAM(S): 80 TABLET, FILM COATED ORAL at 11:18

## 2021-12-22 NOTE — CHART NOTE - NSCHARTNOTEFT_GEN_A_CORE
Medicine PA-   Cd initially @ 2230 for pt that is rapid afib 130's -140 on monitor, asymptomatic, resting in NAD. Pt hx HFrEF, pAFib, HTN, HLD, Macular degeneration, former smoker, DM2 presenting with chief complaint of dizziness and found to have RLL mass on CT with skeletal metastasis. Pt takes lopressor 100mg daily and received lopressor IVP while in ED, given lopressor 5mg IVP which helped a short time 110's but rate back 120's-130 and cardizem 5mg IVP x 2 was given, pt continues to be asymptomatic.    Vital Signs Last 24 Hrs  T(C): 36.8 (22 Dec 2021 04:20), Max: 37.4 (21 Dec 2021 22:12)  T(F): 98.2 (22 Dec 2021 04:20), Max: 99.3 (21 Dec 2021 22:12)  HR: 143 (22 Dec 2021 04:20) (90 - 170)  BP: 118/75 (22 Dec 2021 04:20) (118/75 - 142/63)  BP(mean): --  RR: 18 (22 Dec 2021 04:20) (18 - 20)  SpO2: 95% (22 Dec 2021 04:20) (93% - 97%)    >Afib RVR  -lopressor PO due now  -cardio following  -continue to monitor  -call PA if status changes

## 2021-12-22 NOTE — CONSULT NOTE ADULT - ASSESSMENT
87F here found to have right lower lobe mass with metastatic bony lesions, also with PE on lovenox.    #1 Metastatic cancer - unknown origin. patient wishes to forgo further workup since she would not want chemotherapy. hospice evaluation.   #2 Pain - add lidoderm patch to affected bony area, add PRN oxycodone.   #3 PE - on lovenox.   #4 Encounter for palliative care - patient clear with her wishes for no further workup or treatment for likely malignancy She just wants to be home - she lives in an apartment in the same house as her daughter Donna. Hospice evaluation. Spoke with and supported daughter Donna via telephone. - [phone]

## 2021-12-22 NOTE — PATIENT PROFILE ADULT - FALL HARM RISK - HARM RISK INTERVENTIONS

## 2021-12-22 NOTE — CONSULT NOTE ADULT - CONSULT REASON
weakness
" Suspected Metastatic Lung Cancer. Patient wants conservative treatment. Considering Home Hospice."

## 2021-12-22 NOTE — CONSULT NOTE ADULT - TIME BILLING
chart review, lab review, imaging review, notes review. Discussion with  and coordination of care with all relevant providers and consultants caring for patient. Discussion with CCM, Dr. Vance, and complex care KAYCEE Obrien. Discussion with daughter via telephone.

## 2021-12-22 NOTE — PROGRESS NOTE ADULT - ASSESSMENT
87 years old female former Smoker with history of Chronic Systolic Heart Failure, PAF, HTN, HLD, DM 2 and Macular Degeneration presented with Dizziness.     1) Suspected Metastatic Lung Cancer  - Discussed with patient at length, she does not want any Chemo, Radiation or Surgery. She initially was willing for further work up however after today's discussion she does not want any work up and wants conservative treatment. Discussed about Palliative / Hospice Care and she is interested to discuss with them about Home Hospice.   - Palliative Care consulted.    2) Pulmonary Emboli  - Continue FD Lovenox for now, will switch to PO prior to discharge    3) Rapid A. Fib with RVR  - Metoprolol was increased to 50 mg q 6 hours  - Cardizem 60 mg q 8 hours was added this morning  - Continue FD Lovenox  - Cardiology follow up noted     3) Presyncope / Dizziness   - Likely due to above  - Plan as above     4) Elevated Troponin  - Likely due to demand ischemia   - No chest pain    5) Chronic Systolic Heart Failure  - Euvolemic on exam  - Continue Metoprolol as above  - Entresto held for now so she can tolerate Metoprolol and Cardizem for Rapid A. Fib  - Cardiology follow up noted     6) AAA  - Stable  - Discussed with Vascular Surgery  - Patient will follow with Dr. Blankenship as an outpatient     7) HTN  - Continue Metoprolol as above    8) HLD  - Continue Lipitor     9) DM 2  - HbA1c 6.3  - Accu checks and ISS    DVT Prophylaxis -- Patient is on FD Lovenox     GOC: DNR/I. Prognosis guarded. Patient is leaning towards Home Hospice. Palliative Care Consult appreciated.    Dispo: Home in 48 hours.     Discussed with Palliative Care, CM, KAYCEE and RN.

## 2021-12-22 NOTE — HOSPICE CARE NOTE - CONVESATION DETAILS
-	At this time, due to critical shortage staffing in the area, [facility] is unable to provide the best services needed for the patient in question. We suggest the referral sent to other hospices if hospice is the patients/families desired d/c plan. SW responded in [employer] and LM for referring hospital SW.   Mary Dean LMSW  [phone]

## 2021-12-22 NOTE — CONSULT NOTE ADULT - SUBJECTIVE AND OBJECTIVE BOX
HPI: 87F with PMH as listed admitted  with weakness, and dizziness. + weight loss, past smoker. Patient found to have large right lower lobe mass occluding right mainstem bronchus, also with lytic bony lesions on imaging of spine.     PERTINENT PMH REVIEWED: Yes     PAST MEDICAL & SURGICAL HISTORY:  Hypertension  Diabetes  High cholesterol  Overactive bladder  Atrial fibrillation  Abdominal aortic aneurysm  5.5 cm AAA  Aortic valve insufficiency  CHF (congestive heart failure), NYHA class II, chronic, combined  LV dysfunction  Ejection fraction &lt; 50%  35% on 2018 echo  Ascending aortic aneurysm  4.6 cm  TIA (transient ischemic attack)  2017 SSH  Pulmonary HTN  Heart failure, NYHA class 2  chronic combined  LV dysfunction  SOB (shortness of breath)  Edema of both ankles  trace  Former smoker  Thyroid enlarged  Followed by Dr. Brian (endocrinologist)  Stress at home   with bladder cancer  Bladder incontinence  Diastolic dysfunction with heart failure  grade II  Right ventricular dysfunction  Right and left atrial hypertrophy  Severe aortic regurgitation  Mitral valve regurgitation  Tricuspid regurgitation  moderate  Pleural effusion  tanner moderate  H/O cataract extraction  bilateral  H/O foot surgery  left  foot  hammer toe  and bunion repair  History of coronary angiogram  2018  History of tonsillectomy    SOCIAL HISTORY:                      Substance history:                    Admitted from:  home                     Yarsani/spirituality:                    Cultural concerns:                      Surrogate - Daughter Donna Willis      FAMILY HISTORY: No pertinent family history in first degree relatives    Allergies    No Known Allergies    ADVANCE DIRECTIVES/TREATMENT PREFERENCES:  Full code, all aggressive measures desired   DNR/DNI - MOLST, continue all other medical treatments  DNR/DNI - MOLST, comfort measures only     Baseline ADLs (prior to admission):  Independent/ Dependent      Karnofsky/Palliative Performance Status Version 2:  %  http://npcrc.org/files/news/palliative_performance_scale_ppsv2.pdf    Present Symptoms:     Dyspnea: Mild Moderate Severe  Nausea/Vomiting: Yes No  Anxiety:  Yes No  Depression: Yes No  Fatigue: Yes No  Loss of appetite: Yes No  Constipation:     Pain:             Character-            Duration-            Effect-            Factors-            Frequency-            Location-            Severity-    Pain AD Score:  [email or web address].[email or web address].[email or web address]/painad.pdf (press ctrl + left click to view)    Review of Systems: Reviewed                 Unable to obtain due to poor mentation   All others negative    MEDICATIONS  (STANDING):  atorvastatin Oral Tab/Cap - Peds 40 milliGRAM(s) Oral daily  dextrose 40% Gel 15 Gram(s) Oral once  dextrose 5%. 1000 milliLiter(s) (50 mL/Hr) IV Continuous <Continuous>  dextrose 5%. 1000 milliLiter(s) (100 mL/Hr) IV Continuous <Continuous>  dextrose 50% Injectable 25 Gram(s) IV Push once  dextrose 50% Injectable 12.5 Gram(s) IV Push once  dextrose 50% Injectable 25 Gram(s) IV Push once  diltiazem    Tablet 60 milliGRAM(s) Oral every 8 hours  enoxaparin Injectable 75 milliGRAM(s) SubCutaneous every 12 hours  glucagon  Injectable 1 milliGRAM(s) IntraMuscular once  influenza  Vaccine (HIGH DOSE) 0.7 milliLiter(s) IntraMuscular once  insulin lispro (ADMELOG) corrective regimen sliding scale   SubCutaneous three times a day before meals  metoprolol tartrate 50 milliGRAM(s) Oral every 6 hours  oxybutynin XL 10 milliGRAM(s) Oral daily    MEDICATIONS  (PRN):  acetaminophen     Tablet .. 650 milliGRAM(s) Oral every 6 hours PRN Temp greater or equal to 38C (100.4F), Mild Pain (1 - 3), Moderate Pain (4 - 6)  diltiazem Injectable 10 milliGRAM(s) IV Push every 6 hours PRN If HR persistently > 120  ondansetron Injectable 4 milliGRAM(s) IV Push every 6 hours PRN Nausea and/or Vomiting    PHYSICAL EXAM:    Vital Signs Last 24 Hrs  T(C): 36.5 (22 Dec 2021 10:30), Max: 37.4 (21 Dec 2021 22:12)  T(F): 97.7 (22 Dec 2021 10:30), Max: 99.3 (21 Dec 2021 22:12)  HR: 128 (22 Dec 2021 10:30) (114 - 170)  BP: 110/70 (22 Dec 2021 10:30) (110/70 - 135/88)  BP(mean): --  RR: 18 (22 Dec 2021 10:30) (18 - 18)  SpO2: 94% (22 Dec 2021 10:30) (94% - 97%)    General: alert  oriented x ____ lethargic agitated delirious                  cachexia  nonverbal  coma    HEENT: normal  dry mouth  ET tube/trach    Lungs: comfortable tachypnea/labored breathing  excessive secretions    CV: normal  tachycardia    GI: normal  distended  tender  no BS               PEG/NG/OG tube  constipation  last BM:     : normal  incontinent  oliguria/anuria  aguero    MSK: normal  weakness  edema             ambulatory  bedbound/wheelchair bound    Neuro: no focal deficits cognitive impairment dysphagia dysarthria hemiplegia     Skin: normal  pressure ulcers- Stage_____  no rash    LABS:                      10.7   9.62  )-----------( 276      ( 22 Dec 2021 07:08 )             34.1         139  |  100  |  9.8  ----------------------------<  102<H>  4.1   |  23.0  |  0.68    Ca    9.0      22 Dec 2021 07:08  Phos  3.5       Mg     1.8         TPro  6.4<L>  /  Alb  2.8<L>  /  TBili  0.5  /  DBili  x   /  AST  19  /  ALT  9   /  AlkPhos  119      PT/INR - ( 22 Dec 2021 07:08 )   PT: 17.8 sec;   INR: 1.57 ratio      PTT - ( 22 Dec 2021 07:08 )  PTT:30.8 sec  Urinalysis Basic - ( 21 Dec 2021 12:46 )    Color: Yellow / Appearance: Slightly Turbid / S.010 / pH: x  Gluc: x / Ketone: Negative  / Bili: Negative / Urobili: Negative mg/dL   Blood: x / Protein: 30 mg/dL / Nitrite: Negative   Leuk Esterase: Small / RBC: 0-2 /HPF / WBC 6-10   Sq Epi: x / Non Sq Epi: Occasional / Bacteria: Many    I&O's Summary    RADIOLOGY & ADDITIONAL STUDIES:    < from: CT Angio Chest PE Protocol w/ IV Cont (21 @ 17:29) >  IMPRESSION:  Positive for pulmonary emboli in the right lower lobe.  Other findings are unchanged from the chest CT performed earlier on the   same day.    Findings were discussed with Dr. Christian by telephone on 2021 at   1757 hours.    --- End of Report ---    < end of copied text >    < from: CT Chest No Cont (21 @ 14:41) >  IMPRESSION:    Large right lower lobe mass occluding the right lower lobe bronchus,   consistent with neoplasm.    Separate bilateral areas of peripheral consolidation, unclear if neoplasm   or superimposed process, such as infection.    New right paratracheal and subcarinal lymphadenopathy.    New skeletal metastases.    Stable right thyroid mass.    Unchanged 4.4 cm ascending aorta. Increased 6.1 cm x 4.2 cm infrarenal   aortic aneurysm.    --- End of Report ---    < end of copied text >    < from: CT Lumbar Spine No Cont (21 @ 12:35) >  OTHER:  Included retroperitoneum and pelvis are unremarkable.      IMPRESSION:  Interval development of lytic lesions measuring up to 2.8 cm involving   the medial right iliac bone and inferior right sacral alae, concerning   for metastases.    No grossly displaced fracture identified.    Stable 5.6 cm infrarenal abdominal aortic aneurysm.    Recommend MRI for further evaluation.    --- End of Report ---    < end of copied text >       HPI: 87F with PMH as listed admitted  with weakness, and dizziness. + weight loss, past smoker. Patient found to have large right lower lobe mass occluding right mainstem bronchus, also with lytic bony lesions on imaging of spine.     PERTINENT PMH REVIEWED: Yes     PAST MEDICAL & SURGICAL HISTORY:  Hypertension  Diabetes  High cholesterol  Overactive bladder  Atrial fibrillation  Abdominal aortic aneurysm  5.5 cm AAA  Aortic valve insufficiency  CHF (congestive heart failure), NYHA class II, chronic, combined  LV dysfunction  Ejection fraction &lt; 50%  35% on 2018 echo  Ascending aortic aneurysm  4.6 cm  TIA (transient ischemic attack)  2017 SSH  Pulmonary HTN  Heart failure, NYHA class 2  chronic combined  LV dysfunction  SOB (shortness of breath)  Edema of both ankles  trace  Former smoker  Thyroid enlarged  Followed by Dr. Brian (endocrinologist)  Stress at home   with bladder cancer  Bladder incontinence  Diastolic dysfunction with heart failure  grade II  Right ventricular dysfunction  Right and left atrial hypertrophy  Severe aortic regurgitation  Mitral valve regurgitation  Tricuspid regurgitation  moderate  Pleural effusion  tanner moderate  H/O cataract extraction  bilateral  H/O foot surgery  left  foot  hammer toe  and bunion repair  History of coronary angiogram  2018  History of tonsillectomy    SOCIAL HISTORY:                      Substance history: no ETOH                     Admitted from:  home                     Yazidi/spirituality:                    Cultural concerns: none                       Surrogate - Daughter Donna Willis      FAMILY HISTORY: No pertinent family history in first degree relatives    Allergies    No Known Allergies    ADVANCE DIRECTIVES/TREATMENT PREFERENCES:  DNR/DNI - MOLST, continue all other medical treatments    Baseline ADLs (prior to admission):  Dependent      Karnofsky/Palliative Performance Status Version 2:  60 %  http://npcrc.org/files/news/palliative_performance_scale_ppsv2.pdf    Present Symptoms:     Dyspnea: Mild   Nausea/Vomiting: No  Anxiety:  No  Depression: No  Fatigue: No  Loss of appetite: No  Constipation: none     Pain: none             Character-            Duration-            Effect-            Factors-            Frequency-            Location-            Severity-    Pain AD Score:  [email or web address].[email or web address].[email or web address]/cog/painad.pdf (press ctrl + left click to view)    Review of Systems: Reviewed      Positive - back pain       Negative - no shortness of breath   All other review of systems negative    MEDICATIONS  (STANDING):  atorvastatin Oral Tab/Cap - Peds 40 milliGRAM(s) Oral daily  dextrose 40% Gel 15 Gram(s) Oral once  dextrose 5%. 1000 milliLiter(s) (50 mL/Hr) IV Continuous <Continuous>  dextrose 5%. 1000 milliLiter(s) (100 mL/Hr) IV Continuous <Continuous>  dextrose 50% Injectable 25 Gram(s) IV Push once  dextrose 50% Injectable 12.5 Gram(s) IV Push once  dextrose 50% Injectable 25 Gram(s) IV Push once  diltiazem    Tablet 60 milliGRAM(s) Oral every 8 hours  enoxaparin Injectable 75 milliGRAM(s) SubCutaneous every 12 hours  glucagon  Injectable 1 milliGRAM(s) IntraMuscular once  influenza  Vaccine (HIGH DOSE) 0.7 milliLiter(s) IntraMuscular once  insulin lispro (ADMELOG) corrective regimen sliding scale   SubCutaneous three times a day before meals  metoprolol tartrate 50 milliGRAM(s) Oral every 6 hours  oxybutynin XL 10 milliGRAM(s) Oral daily    MEDICATIONS  (PRN):  acetaminophen     Tablet .. 650 milliGRAM(s) Oral every 6 hours PRN Temp greater or equal to 38C (100.4F), Mild Pain (1 - 3), Moderate Pain (4 - 6)  diltiazem Injectable 10 milliGRAM(s) IV Push every 6 hours PRN If HR persistently > 120  ondansetron Injectable 4 milliGRAM(s) IV Push every 6 hours PRN Nausea and/or Vomiting    PHYSICAL EXAM:    Vital Signs Last 24 Hrs  T(C): 36.5 (22 Dec 2021 10:30), Max: 37.4 (21 Dec 2021 22:12)  T(F): 97.7 (22 Dec 2021 10:30), Max: 99.3 (21 Dec 2021 22:12)  HR: 128 (22 Dec 2021 10:30) (114 - 170)  BP: 110/70 (22 Dec 2021 10:30) (110/70 - 135/88)  BP(mean): --  RR: 18 (22 Dec 2021 10:30) (18 - 18)  SpO2: 94% (22 Dec 2021 10:30) (94% - 97%)    General: alert and oriented x 4      HEENT: normal      Lungs: none     CV: normal      GI: normal      : normal      MSK: weakness      Neuro: no focal deficits     Skin: no rash    LABS:                      10.7   9.62  )-----------( 276      ( 22 Dec 2021 07:08 )             34.1     12-    139  |  100  |  9.8  ----------------------------<  102<H>  4.1   |  23.0  |  0.68    Ca    9.0      22 Dec 2021 07:08  Phos  3.5       Mg     1.8         TPro  6.4<L>  /  Alb  2.8<L>  /  TBili  0.5  /  DBili  x   /  AST  19  /  ALT  9   /  AlkPhos  119      PT/INR - ( 22 Dec 2021 07:08 )   PT: 17.8 sec;   INR: 1.57 ratio      PTT - ( 22 Dec 2021 07:08 )  PTT:30.8 sec  Urinalysis Basic - ( 21 Dec 2021 12:46 )    Color: Yellow / Appearance: Slightly Turbid / S.010 / pH: x  Gluc: x / Ketone: Negative  / Bili: Negative / Urobili: Negative mg/dL   Blood: x / Protein: 30 mg/dL / Nitrite: Negative   Leuk Esterase: Small / RBC: 0-2 /HPF / WBC 6-10   Sq Epi: x / Non Sq Epi: Occasional / Bacteria: Many    I&O's Summary    RADIOLOGY & ADDITIONAL STUDIES:    < from: CT Angio Chest PE Protocol w/ IV Cont (21 @ 17:29) >  IMPRESSION:  Positive for pulmonary emboli in the right lower lobe.  Other findings are unchanged from the chest CT performed earlier on the   same day.    Findings were discussed with Dr. Christian by telephone on 2021 at   1757 hours.    --- End of Report ---    < end of copied text >    < from: CT Chest No Cont (21 @ 14:41) >  IMPRESSION:    Large right lower lobe mass occluding the right lower lobe bronchus,   consistent with neoplasm.    Separate bilateral areas of peripheral consolidation, unclear if neoplasm   or superimposed process, such as infection.    New right paratracheal and subcarinal lymphadenopathy.    New skeletal metastases.    Stable right thyroid mass.    Unchanged 4.4 cm ascending aorta. Increased 6.1 cm x 4.2 cm infrarenal   aortic aneurysm.    --- End of Report ---    < end of copied text >    < from: CT Lumbar Spine No Cont (21 @ 12:35) >  OTHER:  Included retroperitoneum and pelvis are unremarkable.      IMPRESSION:  Interval development of lytic lesions measuring up to 2.8 cm involving   the medial right iliac bone and inferior right sacral alae, concerning   for metastases.    No grossly displaced fracture identified.    Stable 5.6 cm infrarenal abdominal aortic aneurysm.    Recommend MRI for further evaluation.    --- End of Report ---    < end of copied text >

## 2021-12-22 NOTE — PATIENT PROFILE ADULT - NS PRO AD ANY ON CHART
Photo Preface (Leave Blank If You Do Not Want): Photographs were obtained today Detail Level: Zone No

## 2021-12-23 ENCOUNTER — TRANSCRIPTION ENCOUNTER (OUTPATIENT)
Age: 86
End: 2021-12-23

## 2021-12-23 VITALS
HEART RATE: 110 BPM | RESPIRATION RATE: 18 BRPM | SYSTOLIC BLOOD PRESSURE: 119 MMHG | TEMPERATURE: 98 F | DIASTOLIC BLOOD PRESSURE: 87 MMHG | OXYGEN SATURATION: 92 %

## 2021-12-23 LAB
ANION GAP SERPL CALC-SCNC: 14 MMOL/L — SIGNIFICANT CHANGE UP (ref 5–17)
BUN SERPL-MCNC: 15 MG/DL — SIGNIFICANT CHANGE UP (ref 8–20)
CALCIUM SERPL-MCNC: 8.8 MG/DL — SIGNIFICANT CHANGE UP (ref 8.6–10.2)
CHLORIDE SERPL-SCNC: 99 MMOL/L — SIGNIFICANT CHANGE UP (ref 98–107)
CO2 SERPL-SCNC: 25 MMOL/L — SIGNIFICANT CHANGE UP (ref 22–29)
CREAT SERPL-MCNC: 0.76 MG/DL — SIGNIFICANT CHANGE UP (ref 0.5–1.3)
GLUCOSE BLDC GLUCOMTR-MCNC: 110 MG/DL — HIGH (ref 70–99)
GLUCOSE BLDC GLUCOMTR-MCNC: 111 MG/DL — HIGH (ref 70–99)
GLUCOSE BLDC GLUCOMTR-MCNC: 136 MG/DL — HIGH (ref 70–99)
GLUCOSE SERPL-MCNC: 101 MG/DL — HIGH (ref 70–99)
HCT VFR BLD CALC: 34 % — LOW (ref 34.5–45)
HGB BLD-MCNC: 10.6 G/DL — LOW (ref 11.5–15.5)
MAGNESIUM SERPL-MCNC: 2 MG/DL — SIGNIFICANT CHANGE UP (ref 1.8–2.6)
MCHC RBC-ENTMCNC: 25.7 PG — LOW (ref 27–34)
MCHC RBC-ENTMCNC: 31.2 GM/DL — LOW (ref 32–36)
MCV RBC AUTO: 82.5 FL — SIGNIFICANT CHANGE UP (ref 80–100)
PLATELET # BLD AUTO: 270 K/UL — SIGNIFICANT CHANGE UP (ref 150–400)
POTASSIUM SERPL-MCNC: 4.1 MMOL/L — SIGNIFICANT CHANGE UP (ref 3.5–5.3)
POTASSIUM SERPL-SCNC: 4.1 MMOL/L — SIGNIFICANT CHANGE UP (ref 3.5–5.3)
RBC # BLD: 4.12 M/UL — SIGNIFICANT CHANGE UP (ref 3.8–5.2)
RBC # FLD: 15.5 % — HIGH (ref 10.3–14.5)
SODIUM SERPL-SCNC: 138 MMOL/L — SIGNIFICANT CHANGE UP (ref 135–145)
WBC # BLD: 8.61 K/UL — SIGNIFICANT CHANGE UP (ref 3.8–10.5)
WBC # FLD AUTO: 8.61 K/UL — SIGNIFICANT CHANGE UP (ref 3.8–10.5)

## 2021-12-23 PROCEDURE — 99239 HOSP IP/OBS DSCHRG MGMT >30: CPT

## 2021-12-23 RX ORDER — SACUBITRIL AND VALSARTAN 24; 26 MG/1; MG/1
1 TABLET, FILM COATED ORAL
Qty: 0 | Refills: 0 | DISCHARGE

## 2021-12-23 RX ORDER — DILTIAZEM HCL 120 MG
1 CAPSULE, EXT RELEASE 24 HR ORAL
Qty: 30 | Refills: 0
Start: 2021-12-23 | End: 2022-01-21

## 2021-12-23 RX ORDER — FONDAPARINUX SODIUM 2.5 MG/.5ML
1 INJECTION, SOLUTION SUBCUTANEOUS
Qty: 42 | Refills: 0
Start: 2021-12-23 | End: 2022-01-12

## 2021-12-23 RX ORDER — APIXABAN 2.5 MG/1
1 TABLET, FILM COATED ORAL
Qty: 0 | Refills: 0 | DISCHARGE

## 2021-12-23 RX ORDER — ACETAMINOPHEN 500 MG
2 TABLET ORAL
Qty: 0 | Refills: 0 | DISCHARGE
Start: 2021-12-23

## 2021-12-23 RX ORDER — LIDOCAINE 4 G/100G
1 CREAM TOPICAL
Qty: 30 | Refills: 0
Start: 2021-12-23 | End: 2022-01-21

## 2021-12-23 RX ORDER — OXYCODONE HYDROCHLORIDE 5 MG/1
1 TABLET ORAL
Qty: 20 | Refills: 0
Start: 2021-12-23 | End: 2021-12-27

## 2021-12-23 RX ORDER — METOPROLOL TARTRATE 50 MG
1 TABLET ORAL
Qty: 0 | Refills: 0 | DISCHARGE

## 2021-12-23 RX ORDER — METOPROLOL TARTRATE 50 MG
1 TABLET ORAL
Qty: 60 | Refills: 0
Start: 2021-12-23 | End: 2022-01-21

## 2021-12-23 RX ADMIN — Medication 60 MILLIGRAM(S): at 05:38

## 2021-12-23 RX ADMIN — Medication 50 MILLIGRAM(S): at 05:38

## 2021-12-23 RX ADMIN — Medication 60 MILLIGRAM(S): at 14:25

## 2021-12-23 RX ADMIN — Medication 50 MILLIGRAM(S): at 11:24

## 2021-12-23 RX ADMIN — ATORVASTATIN CALCIUM 40 MILLIGRAM(S): 80 TABLET, FILM COATED ORAL at 11:24

## 2021-12-23 RX ADMIN — Medication 10 MILLIGRAM(S): at 14:25

## 2021-12-23 RX ADMIN — Medication 10 MILLIGRAM(S): at 11:25

## 2021-12-23 RX ADMIN — ENOXAPARIN SODIUM 75 MILLIGRAM(S): 100 INJECTION SUBCUTANEOUS at 05:38

## 2021-12-23 NOTE — DISCHARGE NOTE PROVIDER - HOSPITAL COURSE
87 years old female former Smoker with history of Chronic Systolic Heart Failure, PAF, HTN, HLD, DM 2 and Macular Degeneration presented with Dizziness. Admitted with Suspected Metastatic Lung Cancer and Pulmonary Emboli. She was started on FD Lovenox. She was discussed at length about her diagnosis, further work up and treatment plan based on work up, however she does not want further work up or treatment. Opted for Home Hospice. Seen by Palliative / Hospice teams and started arrangements for Home Hospice.   During hospitalization, she went into Chronic A. Fib with RVR. Metoprolol was increased and Cardizem was added. Entresto was held as she can tolerate Metoprolol and Cardizem for A. Fib. She is feeling better and is stable for discharge.     PHYSICAL EXAM:  Vital Signs   T(C): 36.4 (23 Dec 2021 11:20), Max: 36.9 (22 Dec 2021 15:29)  T(F): 97.5 (23 Dec 2021 11:20), Max: 98.4 (22 Dec 2021 15:29)  HR: 99 (23 Dec 2021 11:20) (71 - 99)  BP: 113/71 (23 Dec 2021 11:20) (113/71 - 138/55)  RR: 18 (23 Dec 2021 11:20) (18 - 18)  SpO2: 95% (23 Dec 2021 11:20) (95% - 97%)   General: Elderly female sitting in bed comfortably. No acute distress  HEENT: EOMI. Clear conjunctivae. Moist mucus membrane  Neck: Supple.   Chest: Decreased air entry on right base. No wheezing, rales or rhonchi. No chest wall tenderness.  Heart: S1 & S2 with irregular rhythm.    Abdomen: Soft. Non-tender. Non-distended. + BS  Ext: No pedal edema. No calf tenderness   Neuro: AAO x 3. No focal deficit. No speech disorder  Skin: Warm and Dry  Psychiatry: Normal mood and affect    Time spent: 45 minutes

## 2021-12-23 NOTE — PHYSICAL THERAPY INITIAL EVALUATION ADULT - ADDITIONAL COMMENTS
Pt AxOx4 states she lives with daughter in 2 family home with 4STE 1HR and no steps inside. Pt was independent PTA without use of DME and does not own equipment. Pt daughter will be home 24/7 upon d/c.

## 2021-12-23 NOTE — DISCHARGE NOTE PROVIDER - CARE PROVIDER_API CALL
Luis Simons)  Cardiology; Internal Medicine  [address] [place] [address]  Phone: [phone]  Fax: [phone]  Follow Up Time: 2 weeks

## 2021-12-23 NOTE — DISCHARGE NOTE PROVIDER - NSDCCPCAREPLAN_GEN_ALL_CORE_FT
PRINCIPAL DISCHARGE DIAGNOSIS  Diagnosis: Metastatic lung cancer (metastasis from lung to other site)  Assessment and Plan of Treatment: Patient does not want further work up or treatment. Opted for Home Hospice.      SECONDARY DISCHARGE DIAGNOSES  Diagnosis: Pulmonary emboli  Assessment and Plan of Treatment: Continue Xarelto as prescribed.    Diagnosis: Chronic atrial fibrillation with RVR  Assessment and Plan of Treatment: Continue medications as prescribed.  Follow up with Cardio in 2 weeks.

## 2021-12-23 NOTE — GOALS OF CARE CONVERSATION - ADVANCED CARE PLANNING - CONVERSATION DETAILS
Care Manager Note: Chart reviewed.  Case discussed with interdisciplinary team.  Palliative team requesting hospice referral following goals of care discussion that was held on this date.  This writer outreached daughter Donna ([phone]) who confirmed interest in hospice.  Overview provided and all questions answered at this time.  Daughter noted that she resides with patient and will be available to care/assist patient 24/7 along with additional family.  Daughter did not have a preference for hospice agency so referral sent to local agencies at this time for review.  Awaiting determination at this time.  Per daughter she would prefer to bring patient home prior to hospice SOC due to the holidays and ability to provide care for patient until hospice services can be secured.  Medications/DME would just need to be secured prior to discharge.  Awaiting further determination at this time in order to coordinate services.
Discussed with patient regarding advanced directives. She states she would not want CPR or intubation in the case of cardiopulmonary arrest. Patient would be like to be DNR/DNI. MOLST completed.
Care Manager Note: Chart reviewed.  Case discussed with interdisciplinary team.  Per medical team patient medically stable for DC home today.  This writer spoke with patient's daughter Donna ([phone]) who has a contact at [facility] and requesting that agency.  [facility] willing to accept and noted DME (hospital bed) will be delivered today between 2-6pm and intake RN expected within 24-48hrs.  Donna in agreement and noted that patient will have another bed to stay in until hospital bed is delivered so requesting 3pm transport home.  Patient presently on room air as well.  This writer discussed potential out-of-pocket costs for transport should insurance not cover the costs in full to which daughter verbalized understanding.  Transport requested for 3pm today, going home with hospice.  MD as well as RN also notified of such.  Medications sent to patient's local pharmacy and daughter in agreement to pick such up.  All parties in agreement with current discharge plan.

## 2021-12-23 NOTE — PROGRESS NOTE ADULT - SUBJECTIVE AND OBJECTIVE BOX
[facility] - [facility], THE HEART CENTER                                   [address] [place] [address]                                                      PHONE: [phone]                                                         FAX: [phone]  [email or web address]  ---------------------------------------------------------------------------------------------------------------------------------    Overnight events/patient complaints:  NAD     No Known Allergies    MEDICATIONS  (STANDING):  atorvastatin Oral Tab/Cap - Peds 40 milliGRAM(s) Oral daily  dextrose 40% Gel 15 Gram(s) Oral once  dextrose 5%. 1000 milliLiter(s) (50 mL/Hr) IV Continuous <Continuous>  dextrose 5%. 1000 milliLiter(s) (100 mL/Hr) IV Continuous <Continuous>  dextrose 50% Injectable 25 Gram(s) IV Push once  dextrose 50% Injectable 12.5 Gram(s) IV Push once  dextrose 50% Injectable 25 Gram(s) IV Push once  diltiazem    Tablet 60 milliGRAM(s) Oral every 8 hours  enoxaparin Injectable 75 milliGRAM(s) SubCutaneous every 12 hours  glucagon  Injectable 1 milliGRAM(s) IntraMuscular once  influenza  Vaccine (HIGH DOSE) 0.7 milliLiter(s) IntraMuscular once  insulin lispro (ADMELOG) corrective regimen sliding scale   SubCutaneous three times a day before meals  lidocaine   4% Patch 1 Patch Transdermal every 24 hours  metoprolol tartrate 50 milliGRAM(s) Oral every 6 hours  oxybutynin XL 10 milliGRAM(s) Oral daily    MEDICATIONS  (PRN):  acetaminophen     Tablet .. 650 milliGRAM(s) Oral every 6 hours PRN Temp greater or equal to 38C (100.4F), Mild Pain (1 - 3)  diltiazem Injectable 10 milliGRAM(s) IV Push every 6 hours PRN If HR persistently > 120  ondansetron Injectable 4 milliGRAM(s) IV Push every 6 hours PRN Nausea and/or Vomiting  oxyCODONE    IR 5 milliGRAM(s) Oral every 6 hours PRN mild-moderate pain  oxyCODONE    IR 10 milliGRAM(s) Oral every 6 hours PRN Severe Pain (7 - 10)      Vital Signs Last 24 Hrs  T(C): 36.7 (23 Dec 2021 04:39), Max: 36.9 (22 Dec 2021 15:29)  T(F): 98 (23 Dec 2021 04:39), Max: 98.4 (22 Dec 2021 15:29)  HR: 71 (23 Dec 2021 04:39) (71 - 128)  BP: 135/71 (23 Dec 2021 04:39) (110/70 - 138/55)  BP(mean): --  RR: 18 (23 Dec 2021 04:39) (18 - 18)  SpO2: 97% (23 Dec 2021 04:39) (94% - 97%)  ICU Vital Signs Last 24 Hrs  [facility]  I&O's Detail    I&O's Summary    Drug Dosing Weight  ELAN [facility]      PHYSICAL EXAM:  General: Appears well developed, alert and cooperative.  HEENT: Head; normocephalic, atraumatic.  Eyes: Pupils reactive, cornea wnl.  Neck: Supple, no nodes adenopathy, no NVD or carotid bruit or thyromegaly.  CARDIOVASCULAR: Normal S1 and S2, 3/6 murmur, rub, gallop or lift.   LUNGS: No rales, rhonchi or wheeze. Normal breath sounds bilaterally.  ABDOMEN: Soft, nontender without mass or organomegaly. bowel sounds normoactive.  EXTREMITIES: No clubbing, cyanosis or edema. Distal pulses wnl.   SKIN: warm and dry with normal turgor.  NEURO: Alert/oriented x 3/normal motor exam. No pathologic reflexes.    PSYCH: normal affect.        LABS:                        10.6   8.61  )-----------( 270      ( 23 Dec 2021 07:12 )             34.0     12-    138  |  99  |  15.0  ----------------------------<  101<H>  4.1   |  25.0  |  0.76    Ca    8.8      23 Dec 2021 07:12  Phos  3.5     12-  Mg     2.0     12-    TPro  6.4<L>  /  Alb  2.8<L>  /  TBili  0.5  /  DBili  x   /  AST  19  /  ALT  9   /  AlkPhos  119  12-    ELAN MOSS  CARDIAC MARKERS ( 22 Dec 2021 07:08 )  x     / 0.10 ng/mL / x     / x     / x      CARDIAC MARKERS ( 21 Dec 2021 20:53 )  x     / 0.08 ng/mL / x     / x     / x      CARDIAC MARKERS ( 21 Dec 2021 10:32 )  x     / 0.07 ng/mL / x     / x     / x          PT/INR - ( 22 Dec 2021 07:08 )   PT: 17.8 sec;   INR: 1.57 ratio         PTT - ( 22 Dec 2021 07:08 )  PTT:30.8 sec  Urinalysis Basic - ( 21 Dec 2021 12:46 )    Color: Yellow / Appearance: Slightly Turbid / S.010 / pH: x  Gluc: x / Ketone: Negative  / Bili: Negative / Urobili: Negative mg/dL   Blood: x / Protein: 30 mg/dL / Nitrite: Negative   Leuk Esterase: Small / RBC: 0-2 /HPF / WBC 6-10   Sq Epi: x / Non Sq Epi: Occasional / Bacteria: Many        RADIOLOGY & ADDITIONAL STUDIES:    INTERPRETATION OF TELEMETRY (personally reviewed):      In summary, ELAN MOSS is an 87y Female with past medical history significant for NICM LVEF 45%, paroxysmal atrial fibrillation on AC, HTN, HLD presented today with AF RVR in setting of PE right lower lobe; + trop not C/W with ACS and likely due to PE; volume status stable; 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 
Heart failure    HPI:  Patient is a 86 yo F w/ PMH of HFrEF, pAFib, HTN, HLD, Macular degeneration, former smoker, DM2 presenting with chief complaint of dizziness. Patient states she was at home today and started feeling symptoms of dizziness and increased generalized weakness. She was trying to get up from a chair and was unable to do so. She denies LOC. She states she has had intermittent dizziness for a while, described as a room spinning sensation.  She also endorses 20 lb weight loss in the past 6 months. She is a former smoker.    She denies fever, chills, chest pain, cough, sob, palpitations, orthopnea/PND, abd pain, nausea, vomiting, diarrhea, constipation.  (21 Dec 2021 18:01)    Interval History:  Patient was seen and examined at bedside around 8:15 am.  Dizziness is better.   Feels palpitations at times.   Denies chest pain, shortness of breath, headache, dizziness, visual symptoms, nausea, vomiting or abdominal pain.    ROS:  As per interval history otherwise unremarkable.    PHYSICAL EXAM:  Vital Signs   T(C): 36.5 (22 Dec 2021 10:30), Max: 37.4 (21 Dec 2021 22:12)  T(F): 97.7 (22 Dec 2021 10:30), Max: 99.3 (21 Dec 2021 22:12)  HR: 128 (22 Dec 2021 10:30) (114 - 170)  BP: 110/70 (22 Dec 2021 10:30) (110/70 - 135/88)  RR: 18 (22 Dec 2021 10:30) (18 - 18)  SpO2: 94% (22 Dec 2021 10:30) (94% - 97%)  General: Elderly female sitting in bed comfortably. No acute distress  HEENT: EOMI. Clear conjunctivae. Moist mucus membrane  Neck: Supple.   Chest: Decreased air entry on right base. No wheezing, rales or rhonchi. No chest wall tenderness.  Heart: S1 & S2 with fast and irregular rhythm.    Abdomen: Soft. Non-tender. Non-distended. + BS  Ext: No pedal edema. No calf tenderness   Neuro: AAO x 3. No focal deficit. No speech disorder  Skin: Warm and Dry  Psychiatry: Normal mood and affect    LABS:  CAPILLARY BLOOD GLUCOSE  POCT Blood Glucose.: 129 mg/dL (22 Dec 2021 11:10)  POCT Blood Glucose.: 102 mg/dL (22 Dec 2021 07:44)  POCT Blood Glucose.: 167 mg/dL (21 Dec 2021 23:41)                        10.7   9.62  )-----------( 276      ( 22 Dec 2021 07:08 )             34.1     12    139  |  100  |  9.8  ----------------------------<  102<H>  4.1   |  23.0  |  0.68    Ca    9.0      22 Dec 2021 07:08  Phos  3.5       Mg     1.8         TPro  6.4<L>  /  Alb  2.8<L>  /  TBili  0.5  /  DBili  x   /  AST  19  /  ALT  9   /  AlkPhos  119  12-    PT/INR - ( 22 Dec 2021 07:08 )   PT: 17.8 sec;   INR: 1.57 ratio       PTT - ( 22 Dec 2021 07:08 )  PTT:30.8 sec  Urinalysis Basic - ( 21 Dec 2021 12:46 )    Color: Yellow / Appearance: Slightly Turbid / S.010 / pH: x  Gluc: x / Ketone: Negative  / Bili: Negative / Urobili: Negative mg/dL   Blood: x / Protein: 30 mg/dL / Nitrite: Negative   Leuk Esterase: Small / RBC: 0-2 /HPF / WBC 6-10   Sq Epi: x / Non Sq Epi: Occasional / Bacteria: Many    RADIOLOGY & ADDITIONAL STUDIES:  CT Head No Cont (21 @ 12:36)  EXAM:  CT BRAIN                        EXAM:  CT ANGIO NECK (W)AW IC                        EXAM:  CT ANGIO BRAIN (W)AW IC      CT brain:  No hydrocephalus, acute intracranial hemorrhage, mass effect, or brain edema.  Extensive white matter microvascular ischemic disease.    CTA brain:  No flow-limiting stenosis or vascular aneurysm. No AVM.    CTA neck:  No flow-limiting stenosis or vascular aneurysm.  No evidence for arterial dissection    Partially visualized nodular and groundglass airspace opacities and in the posterior right lung. Incompletely visualized right-sided pleural effusion. Additional airspace opacities in the visualized left lung.   Correlation with dedicated CT of the chest is recommended for further evaluation.    Marked enlargement and heterogeneity of the right thyroid lobe which extends into the superior mediastinum. Clinical correlation is recommended    CT Chest No Cont (21 @ 14:41)   Large right lower lobe mass occluding the right lower lobe bronchus, consistent with neoplasm.  Separate bilateral areas of peripheral consolidation, unclear if neoplasm or superimposed process, such as infection.  New right paratracheal and subcarinal lymphadenopathy.  New skeletal metastases.  Stable right thyroid mass.  Unchanged 4.4 cm ascending aorta. Increased 6.1 cm x 4.2 cm infrarenal aortic aneurysm.    CT Angio Chest PE Protocol w/ IV Cont (21 @ 17:29)   Positive for pulmonary emboli in the right lower lobe.  Other findings are unchanged from the chest CT performed earlier on the same day.    MEDICATIONS  (STANDING):  atorvastatin Oral Tab/Cap - Peds 40 milliGRAM(s) Oral daily  dextrose 40% Gel 15 Gram(s) Oral once  dextrose 5%. 1000 milliLiter(s) (50 mL/Hr) IV Continuous <Continuous>  dextrose 5%. 1000 milliLiter(s) (100 mL/Hr) IV Continuous <Continuous>  dextrose 50% Injectable 25 Gram(s) IV Push once  dextrose 50% Injectable 12.5 Gram(s) IV Push once  dextrose 50% Injectable 25 Gram(s) IV Push once  diltiazem    Tablet 60 milliGRAM(s) Oral every 8 hours  enoxaparin Injectable 75 milliGRAM(s) SubCutaneous every 12 hours  glucagon  Injectable 1 milliGRAM(s) IntraMuscular once  influenza  Vaccine (HIGH DOSE) 0.7 milliLiter(s) IntraMuscular once  insulin lispro (ADMELOG) corrective regimen sliding scale   SubCutaneous three times a day before meals  metoprolol tartrate 50 milliGRAM(s) Oral every 6 hours  oxybutynin XL 10 milliGRAM(s) Oral daily    MEDICATIONS  (PRN):  acetaminophen     Tablet .. 650 milliGRAM(s) Oral every 6 hours PRN Temp greater or equal to 38C (100.4F), Mild Pain (1 - 3), Moderate Pain (4 - 6)  diltiazem Injectable 10 milliGRAM(s) IV Push every 6 hours PRN If HR persistently > 120  ondansetron Injectable 4 milliGRAM(s) IV Push every 6 hours PRN Nausea and/or Vomiting      
                [place] - [facility], THE HEART CENTER                                   [address] [place] [address]                                                      PHONE: [phone]                                                         FAX: [phone]  [email or web address]  ---------------------------------------------------------------------------------------------------------------------------------    Overnight events/patient complaints:  NAD feeling well today     No Known Allergies    MEDICATIONS  (STANDING):  atorvastatin Oral Tab/Cap - Peds 40 milliGRAM(s) Oral daily  dextrose 40% Gel 15 Gram(s) Oral once  dextrose 5%. 1000 milliLiter(s) (50 mL/Hr) IV Continuous <Continuous>  dextrose 5%. 1000 milliLiter(s) (100 mL/Hr) IV Continuous <Continuous>  dextrose 50% Injectable 25 Gram(s) IV Push once  dextrose 50% Injectable 12.5 Gram(s) IV Push once  dextrose 50% Injectable 25 Gram(s) IV Push once  diltiazem    Tablet 60 milliGRAM(s) Oral every 8 hours  enoxaparin Injectable 75 milliGRAM(s) SubCutaneous every 12 hours  glucagon  Injectable 1 milliGRAM(s) IntraMuscular once  influenza  Vaccine (HIGH DOSE) 0.7 milliLiter(s) IntraMuscular once  insulin lispro (ADMELOG) corrective regimen sliding scale   SubCutaneous three times a day before meals  metoprolol tartrate 50 milliGRAM(s) Oral every 6 hours  oxybutynin XL 10 milliGRAM(s) Oral daily    MEDICATIONS  (PRN):  acetaminophen     Tablet .. 650 milliGRAM(s) Oral every 6 hours PRN Temp greater or equal to 38C (100.4F), Mild Pain (1 - 3), Moderate Pain (4 - 6)  diltiazem Injectable 10 milliGRAM(s) IV Push every 6 hours PRN If HR persistently > 120  ondansetron Injectable 4 milliGRAM(s) IV Push every 6 hours PRN Nausea and/or Vomiting      Vital Signs Last 24 Hrs  T(C): 36.3 (22 Dec 2021 08:13), Max: 37.4 (21 Dec 2021 22:12)  T(F): 97.4 (22 Dec 2021 08:13), Max: 99.3 (21 Dec 2021 22:12)  HR: 160 (22 Dec 2021 08:13) (114 - 170)  BP: 135/64 (22 Dec 2021 08:13) (118/75 - 135/88)  BP(mean): --  RR: 18 (22 Dec 2021 08:13) (18 - 18)  SpO2: 96% (22 Dec 2021 08:13) (95% - 97%)  ICU Vital Signs Last 24 Hrs  ELAN MOSS  I&O's Detail    I&O's Summary    Drug Dosing Weight  ELAN MOSS      PHYSICAL EXAM:  General: Appears well developed, alert and cooperative.  HEENT: Head; normocephalic, atraumatic.  Eyes: Pupils reactive, cornea wnl.  Neck: Supple, no nodes adenopathy, no NVD or carotid bruit or thyromegaly.  CARDIOVASCULAR: Normal S1 and S2, 1/6 murmur, rub, gallop or lift.   LUNGS: Decrease BS B/L at the lower bases   ABDOMEN: Soft, nontender without mass or organomegaly. bowel sounds normoactive.  EXTREMITIES: No clubbing, cyanosis or edema. Distal pulses wnl.   SKIN: warm and dry with normal turgor.  NEURO: Alert/oriented x 3/normal motor exam. No pathologic reflexes.    PSYCH: normal affect.        LABS:                        10.7   9.62  )-----------( 276      ( 22 Dec 2021 07:08 )             34.1     12-    139  |  100  |  9.8  ----------------------------<  102<H>  4.1   |  23.0  |  0.68    Ca    9.0      22 Dec 2021 07:08  Phos  3.5     12-  Mg     1.8         TPro  6.4<L>  /  Alb  2.8<L>  /  TBili  0.5  /  DBili  x   /  AST  19  /  ALT  9   /  AlkPhos  119  -    ELAN MOSS  CARDIAC MARKERS ( 22 Dec 2021 07:08 )  x     / 0.10 ng/mL / x     / x     / x      CARDIAC MARKERS ( 21 Dec 2021 20:53 )  x     / 0.08 ng/mL / x     / x     / x      CARDIAC MARKERS ( 21 Dec 2021 10:32 )  x     / 0.07 ng/mL / x     / x     / x          PT/INR - ( 22 Dec 2021 07:08 )   PT: 17.8 sec;   INR: 1.57 ratio         PTT - ( 22 Dec 2021 07:08 )  PTT:30.8 sec  Urinalysis Basic - ( 21 Dec 2021 12:46 )    Color: Yellow / Appearance: Slightly Turbid / S.010 / pH: x  Gluc: x / Ketone: Negative  / Bili: Negative / Urobili: Negative mg/dL   Blood: x / Protein: 30 mg/dL / Nitrite: Negative   Leuk Esterase: Small / RBC: 0-2 /HPF / WBC 6-10   Sq Epi: x / Non Sq Epi: Occasional / Bacteria: Many        RADIOLOGY & ADDITIONAL STUDIES:    INTERPRETATION OF TELEMETRY (personally reviewed):        ECHO 10/26/20  LVEF 40-45%.  mild to moderate MR.  Moderate AI.  Ascending aorta 4.4 cm.    CARDIAC CATHETERIZATION 2018  mild nonobstructive CAD.       < from: CT Angio Chest PE Protocol w/ IV Cont (21 @ 17:29) >    IMPRESSION:  Positive for pulmonary emboli in the right lower lobe.  Other findings are unchanged from the chest CT performed earlier on the   same day.    Findings were discussed with Dr. Christian by telephone on 2021 at   1757 hours.    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

## 2021-12-23 NOTE — DISCHARGE NOTE PROVIDER - NSDCMRMEDTOKEN_GEN_ALL_CORE_FT
acetaminophen 325 mg oral tablet: 2 tab(s) orally every 6 hours, As needed for mild pain or fever.   atorvastatin 40 mg oral tablet: 1 tab(s) orally once a day  bilberry  capsule: 1 cap(s) orally 2 times a day  Dilt- mg/24 hours oral capsule, extended release: 1 cap(s) orally once a day   Lidoderm 5% topical film: Apply topically to affected area once a day   meclizine 25 mg oral tablet: 1 tab(s) orally 3 times a day, As Needed  Metoprolol Succinate  mg oral tablet, extended release: 1 tab(s) orally 2 times a day   oxybutynin 10 mg/24 hr oral tablet, extended release: 1 tab(s) orally once a day  oxyCODONE 5 mg oral tablet: 1 tab(s) orally every 6 hours, As needed for moderate to severe pain. MDD:4 tabs  Xarelto 15 mg oral tablet: 1 tab(s) orally 2 times a day   Xarelto 20 mg oral tablet: 1 tab(s) orally once a day (in the evening). Start after finishing 15 mg twice a day for 21 days.  
(2) assistive person

## 2021-12-23 NOTE — PHYSICAL THERAPY INITIAL EVALUATION ADULT - PERTINENT HX OF CURRENT PROBLEM, REHAB EVAL
Pt with PMH HFrEF, Afib, Macular degeneration, DM2 c/o dizziness, increased generalized weakness found to have RLL mass on CT with skeletal metastasis

## 2021-12-23 NOTE — DISCHARGE NOTE NURSING/CASE MANAGEMENT/SOCIAL WORK - PATIENT PORTAL LINK FT
You can access the FollowMyHealth Patient Portal offered by [facility] by registering at the following website: [email or web address]. By joining [facility]’s FollowMyHealth portal, you will also be able to view your health information using other applications (apps) compatible with our system.

## 2021-12-23 NOTE — DISCHARGE NOTE NURSING/CASE MANAGEMENT/SOCIAL WORK - NSDCVIVACCINE_GEN_ALL_CORE_FT
influenza, injectable, quadrivalent, preservative free; 23-Mar-2017 16:07; Sidra Ponce (RN); [employer]; [id number]; IntraMuscular; Deltoid Left.; 0.5 milliLiter(s); VIS (VIS Published: 07-Aug-2015, VIS Presented: 23-Mar-2017);

## 2021-12-23 NOTE — DISCHARGE NOTE NURSING/CASE MANAGEMENT/SOCIAL WORK - NSDCPEFALRISK_GEN_ALL_CORE
For information on Fall & Injury Prevention, visit: [email or web address] OR  [email or web address] OR  https://www.cdc.gov/steadi/patient.html

## 2021-12-30 PROCEDURE — 71275 CT ANGIOGRAPHY CHEST: CPT | Mod: MA

## 2021-12-30 PROCEDURE — 85379 FIBRIN DEGRADATION QUANT: CPT

## 2021-12-30 PROCEDURE — 83735 ASSAY OF MAGNESIUM: CPT

## 2021-12-30 PROCEDURE — 86703 HIV-1/HIV-2 1 RESULT ANTBDY: CPT

## 2021-12-30 PROCEDURE — 85027 COMPLETE CBC AUTOMATED: CPT

## 2021-12-30 PROCEDURE — 84484 ASSAY OF TROPONIN QUANT: CPT

## 2021-12-30 PROCEDURE — 70498 CT ANGIOGRAPHY NECK: CPT | Mod: MA

## 2021-12-30 PROCEDURE — 85730 THROMBOPLASTIN TIME PARTIAL: CPT

## 2021-12-30 PROCEDURE — 82962 GLUCOSE BLOOD TEST: CPT

## 2021-12-30 PROCEDURE — 99285 EMERGENCY DEPT VISIT HI MDM: CPT

## 2021-12-30 PROCEDURE — 71250 CT THORAX DX C-: CPT | Mod: MA

## 2021-12-30 PROCEDURE — 80053 COMPREHEN METABOLIC PANEL: CPT

## 2021-12-30 PROCEDURE — 85610 PROTHROMBIN TIME: CPT

## 2021-12-30 PROCEDURE — 84100 ASSAY OF PHOSPHORUS: CPT

## 2021-12-30 PROCEDURE — 70450 CT HEAD/BRAIN W/O DYE: CPT | Mod: MA

## 2021-12-30 PROCEDURE — 87637 SARSCOV2&INF A&B&RSV AMP PRB: CPT

## 2021-12-30 PROCEDURE — 85025 COMPLETE CBC W/AUTO DIFF WBC: CPT

## 2021-12-30 PROCEDURE — 93005 ELECTROCARDIOGRAM TRACING: CPT

## 2021-12-30 PROCEDURE — 72131 CT LUMBAR SPINE W/O DYE: CPT | Mod: MA

## 2021-12-30 PROCEDURE — 70496 CT ANGIOGRAPHY HEAD: CPT | Mod: MA

## 2021-12-30 PROCEDURE — 83036 HEMOGLOBIN GLYCOSYLATED A1C: CPT

## 2021-12-30 PROCEDURE — 93306 TTE W/DOPPLER COMPLETE: CPT

## 2021-12-30 PROCEDURE — 71045 X-RAY EXAM CHEST 1 VIEW: CPT

## 2021-12-30 PROCEDURE — 81001 URINALYSIS AUTO W/SCOPE: CPT

## 2021-12-30 PROCEDURE — 80048 BASIC METABOLIC PNL TOTAL CA: CPT

## 2021-12-30 PROCEDURE — 36415 COLL VENOUS BLD VENIPUNCTURE: CPT

## 2021-12-30 PROCEDURE — 83880 ASSAY OF NATRIURETIC PEPTIDE: CPT

## 2022-01-13 RX ORDER — RIVAROXABAN 15 MG-20MG
1 KIT ORAL
Qty: 30 | Refills: 0
Start: 2022-01-13 | End: 2022-02-11

## 2022-02-22 NOTE — DISCHARGE NOTE ADULT - HOSPITAL COURSE
Last well visit 11/9/2021.    is a pleasant 84y/o female who follows w/ as an outpatient. She presented w/acute pulmonary edema and responded to IV lasix. Her echo reveals a low EF of 30-35% and it reveals severe aortic regurgitation. Her cath is negative for coronary artery disease, and she's going to be discharged on po lasix and cardiac medications with outpatient cardiology follow up. She's doing very well and she's comfortable.    She's pleased with her care here at [facility] and is in full agreement with the discharge plan.

## 2022-06-30 NOTE — ED ADULT NURSE NOTE - FALL HARM RISK TYPE OF ASSESSMENT
Patient tolerating increased diet, no nausea or vomiting, burping  Voiding without difficulty, no BM  Pain level at a 5, medication effective  CHRISTINE drain emptied for 20cc  Dressings x3 intact to abdomen  Call bell in reach  Daily Assessment

## 2022-09-28 NOTE — ASU PATIENT PROFILE, ADULT - PRESSURE ULCER(S)
Pt reports he woke up with dizziness, describes it as a woozy feeling, and bilateral numbness to fingertips. Pt has equal sensation and strong , neuro intact. AOx4, pupils equal. Pt denies visual changes, no HA. Reports eating and drinking normal this morning. no

## 2023-03-09 NOTE — DISCHARGE NOTE ADULT - FUNCTIONAL SCREEN CURRENT LEVEL: BATHING, MLM
(0) independent Rhofade Pregnancy And Lactation Text: This medication has not been assigned a Pregnancy Risk Category. It is unknown if the medication is excreted in breast milk.

## 2023-06-26 NOTE — DISCHARGE NOTE PROVIDER - CARE PROVIDERS DIRECT ADDRESSES
[email or web address] Use Therapeutic Ranged Or Therapeutic Target: please select Range or Target

## 2024-07-30 NOTE — ED CLERICAL - DIVISION
[facility] UROLOGY PROGRESS NOTE   [facility]  [address] [place] [address] [place]  [address]  [place] [address]  [phone]  Fax:[phone]  [email or web address]      NAME: Nancy Jones  AGE: 66 y.o. SEX: female  : 1958   MRN: [id number]    DATE: 2024  TIME: 8:19 AM    Assessment and Plan:    Recurrent UTI, symptomatic super imposed on interstitial cystitis-as below, she is taken the Pyridium which I have said before not the best thing but it is the only thing that works for her.  Her bladder is essentially normal, it is large capacity but there are no tumors.  Vaginal exam and cervical os look normal.  For the clot she is passing I recommend that she see gynecology for further studies of this.  From my standpoint, we will give her a full course of Keflex today, check a urine culture and give her Keflex 250 mg daily for 6 months to help prevent any recurrent UTIs to take that off the table in terms of her symptoms.  Follow-up in 6 months or as needed.               Chief Complaint   No chief complaint on file.      History of Present Illness   Follow-up chronic interstitial cystitis and chronic bladder pain last seen by me 2024.  At that time I tried to switch her to Uribel as a trial but I felt she may need to continue with Pyridium indefinitely and although I was not crazy by keeping her on that for long-term it seem to be the only thing that works.  I also referred her back to pelvic floor therapy.  She had a positive urine culture in March but there is significant antibiotic resistance.  However urine culture 2024 showed greater than 100,000 colonies of E. coli and less than 10,000 colonies of Proteus.  This was treated with Keflex.  Because of the recurrent UTIs she is here for cystoscopy.  She had painful burning in the bladder and urethra with and without urination with the infection.  She could feel her bladder filling up and then deflate with urination with pain.  She has  been passing some blood clots when she urinates.       Cystoscopy     Date/Time  7/30/2024 10:00 AM     Performed by  Alexis Hobson MD   Authorized by  Alexis Hobson MD     Universal Protocol:  Consent: Verbal consent obtained. Written consent obtained.      Procedure Details:  Procedure type: cystoscopy    Additional Procedure Details: Cystoscopy Procedure Note        Pre-operative Diagnosis: Recurrent UTI, chronic interstitial cystitis with bladder pain    Post-operative Diagnosis: Same, normal bladder and vagina    Procedure: Flexible cystoscopy    Surgeon: Alexis Hobson MD    Anesthesia: 1% Xylocaine per urethra    EBL: Minimal    Complications: none    Procedure Details   The risks, benefits, complications, treatment options, and expected outcomes were discussed with the patient. The patient concurred with the proposed plan, giving informed consent.    Cystoscopy was performed today under local anesthesia, using sterile technique. The patient was placed in the supine position, prepped with Betadine, and draped in the usual sterile fashion. The flexible cystocope was used to inspect both the urethra and bladder    Findings:  Urethra: Normal without stricture.  No caruncle.    Bladder:  Smooth, not trabeculated and there were no stones tumors or other lesions.  The orifices were orthotopic and intact.  Inspection of the vagina including the cervix shows no lesions, no blood coming from the os of the cervix.  Normal urethra with no caruncle.           Specimens: none                 Complications:  None           Disposition: To home            Condition:  Stable              The following portions of the patient's history were reviewed and updated as appropriate: allergies, current medications, past family history, past medical history, past social history, past surgical history and problem list.  Past Medical History:   Diagnosis Date    A-fib ([facility]) 03/2020    Allergic     Anxiety     Arthritis     Asthma     Back  "pain     and muscle pain    Bruises easily     Chronic pain disorder     Interstitial pain    Colon polyp     Dental bridge present     Depression     Eczema     GERD (gastroesophageal reflux disease)     Heart murmur     History of blood clots     per pt \"blood clot in superior mesenteric artery and has a stent\"    History of pneumonia     Hives     Hyperlipidemia     Hypertension     Infertility, female     Interstitial cystitis     Migraine     sees neurologist    Motion sickness     Obesity     Palpitations     PONV (postoperative nausea and vomiting)     Premature atrial contractions 11/09/2022    Psychiatric disorder     TIA (transient ischemic attack) 09/2022    per pt --\"had MRI and saw Carotid artery Left was blocked\"    Urinary incontinence     wears Pads    Venous insufficiency 08/01/2017    Wears glasses      Past Surgical History:   Procedure Laterality Date    COLONOSCOPY      DILATION AND CURETTAGE OF UTERUS      EGD      EXPLORATORY LAPAROTOMY      for infertility    EXPLORATORY LAPAROTOMY W/ BOWEL RESECTION N/A 7/19/2023    Procedure: LAPAROTOMY EXPLORATORY W/ BOWEL RESECTION;  Surgeon: Crista Recinos MD;  Location: [facility];  Service: General    HAND SURGERY      Hand excision of tendon cyst    MD LAPAROSCOPIC APPENDECTOMY N/A 05/03/2022    Procedure: APPENDECTOMY LAPAROSCOPIC;  Surgeon: Vin Fields MD;  Location: [facility];  Service: General    MD LAPS ABD PRTM&OMENTUM DX W/WO SPEC BR/WA SPX N/A 7/19/2023    Procedure: LAPAROSCOPY DIAGNOSTIC, LYSIS OF ADHESIONS, LAPAROSCOPY TAKE TAKEDOWN OF LEFT COLON, EXPLORATORY LAPAROSCOPY, LYSIS OF ADHESIONS, RESECTION OF TRANSVERSE COLON SPLENIC FLEXURE AND DESCENDING COLON , TAKE DOWN OF SPLENIC FLEXURE, SIGMOIDOSCOPY, MOBILIZATION OF RIGHT COLON, PRIMARY ANASTOMOSIS EEA 29, TAP BLOCK;  Surgeon: Crista Recinos MD;  Location[facility];  Service: General    MD TCAT IV STENT CRV CRTD ART EMBOLIC PROTECJ Left 4/29/2024    Procedure: ANGIOPLASTY " ARTERY CAROTID W/ STENT;  Surgeon: Roberto García DO;  Location: [facility];  Service: Vascular    NH TEAEC W/PATCH GRF CAROTID VERTB SUBCLAV NECK INC Left 1/31/2023    Procedure: EXPLORATION OF LEFT CAROTID ARTERY; ABORTED ENDARTERECTOMY ARTERY CAROTID;  Surgeon: Roberto García DO;  Location: [facility];  Service: Vascular    SUPERIOR MESTENTERIC ARTERY STENT      WISDOM TOOTH EXTRACTION       shoulder  Review of Systems   Review of Systems    Active Problem List     Patient Active Problem List   Diagnosis    Angina pectoris (HCC)    Essential hypertension    Migraines    AMD (age-related macular degeneration), bilateral    Allergic reaction    Gastroesophageal reflux disease without esophagitis    Angio-edema    A-fib (HCC)    Lumbar radiculopathy    CAMILLE (obstructive sleep apnea)    Hypertensive heart disease with congestive heart failure (HCC)    Unspecified diastolic (congestive) heart failure (HCC)    Mesenteric artery thrombosis (HCC)    COPD (chronic obstructive pulmonary disease) (HCC)    Urinary retention    Peripheral vascular disease (HCC)    Appendix disease    Carotid artery stenosis    Stenosis of left carotid artery    Asymptomatic stenosis of left carotid artery    Hepatic steatosis    Partial facial nerve palsy    Injury of left facial nerve    Paresthesia    Colitis    Chronic migraine w/o aura w/o status migrainosus, not intractable    IIH (idiopathic intracranial hypertension)    Hematochezia    Left carotid stenosis    Colonic ischemia (HCC)    Acute postoperative pain    S/P small bowel resection    Anxiety    Omental infarction (HCC)    Hypokalemia    Night sweats    Dysuria    Incisional hernia, without obstruction or gangrene    Stage 3 chronic kidney disease, unspecified whether stage 3a or 3b CKD (HCC)    Ventral hernia without obstruction or gangrene    Atherosclerosis of native arteries of extremities with rest pain, bilateral legs (HCC)    Acute on chronic diastolic (congestive)  heart failure (HCC)    Left facial numbness    Other chest pain    Acute pain of left knee    Acute lateral meniscus tear of left knee    Continuous opioid dependence (HCC)    Obesity, morbid (HCC)    Complex tear of lateral meniscus of left knee as current injury, initial encounter       Objective   LMP  (LMP Unknown)     Physical Exam  Vitals reviewed.   Constitutional:       Appearance: Normal appearance.   HENT:      Head: Normocephalic and atraumatic.   Eyes:      Extraocular Movements: Extraocular movements intact.   Pulmonary:      Effort: Pulmonary effort is normal.   Genitourinary:     General: Normal vulva.      Vagina: No vaginal discharge.      Comments: Normal vagina.  Musculoskeletal:         General: Normal range of motion.      Cervical back: Normal range of motion.   Skin:     Coloration: Skin is not jaundiced or pale.   Neurological:      General: No focal deficit present.      Mental Status: She is alert and oriented to person, place, and time. Mental status is at baseline.   Psychiatric:         Mood and Affect: Mood normal.         Behavior: Behavior normal.         Thought Content: Thought content normal.         Judgment: Judgment normal.             Current Medications     Current Outpatient Medications:     acetaminophen (TYLENOL) 325 mg tablet, Take 2 tablets (650 mg total) by mouth every 6 (six) hours, Disp: , Rfl: 0    amiodarone 100 mg tablet, Take 1 tablet (100 mg total) by mouth daily, Disp: 90 tablet, Rfl: 3    apixaban (Eliquis) 5 mg, TAKE ONE TABLET BY MOUTH TWICE DAILY, Disp: 180 tablet, Rfl: 1    aspirin (ECOTRIN LOW STRENGTH) 81 mg EC tablet, Take 81 mg by mouth daily, Disp: , Rfl:     chlordiazepoxide-clidinium (LIBRAX) 5-2.5 mg per capsule, Take 1 capsule by mouth daily as needed for cramping, Disp: 90 capsule, Rfl: 0    clopidogrel (Plavix) 75 mg tablet, Take 1 tablet (75 mg total) by mouth daily, Disp: 30 tablet, Rfl: 3    Diclofenac Sodium (VOLTAREN) 1 %, Apply 2 g topically  4 (four) times a day, Disp: 100 g, Rfl: 2    diltiazem (CARDIZEM CD) 120 mg 24 hr capsule, TAKE ONE CAPSULE BY MOUTH ONCE DAILY, Disp: 90 capsule, Rfl: 1    Docusate Sodium (COLACE PO), Take by mouth daily at bedtime, Disp: , Rfl:     Evolocumab (Repatha SureClick) 140 MG/ML SOAJ, Inject 1 mL (140 mg total) under the skin every 14 (fourteen) days, Disp: 2 mL, Rfl: 11    fexofenadine (ALLEGRA) 180 MG tablet, Take 180 mg by mouth daily, Disp: , Rfl:     fluticasone-vilanterol (BREO ELLIPTA) 200-25 MCG/INH inhaler, Inhale 1 puff daily Rinse mouth after use., Disp: 3 Inhaler, Rfl: 3    furosemide (LASIX) 40 mg tablet, Take 1 tablet (40 mg total) by mouth daily, Disp: 90 tablet, Rfl: 0    LORazepam (ATIVAN) 1 mg tablet, Take 1 tablet (1 mg total) by mouth 3 (three) times a day, Disp: 90 tablet, Rfl: 0    LORazepam (ATIVAN) 2 mg tablet, Take 1 tablet (2 mg total) by mouth every 8 (eight) hours as needed for anxiety, Disp: 90 tablet, Rfl: 0    magnesium Oxide (MAG-OX) 400 mg TABS, Take 1 tablet (400 mg total) by mouth daily at bedtime, Disp: 90 tablet, Rfl: 3    methocarbamol (ROBAXIN) 500 mg tablet, Take 2 tablets (1,000 mg total) by mouth 3 (three) times a day, Disp: 180 tablet, Rfl: 5    metoprolol succinate (TOPROL-XL) 100 mg 24 hr tablet, Take 1 tablet (100 mg total) by mouth 2 (two) times a day, Disp: 180 tablet, Rfl: 1    omeprazole (PriLOSEC) 40 MG capsule, Take 1 capsule (40 mg total) by mouth 2 (two) times a day before meals, Disp: 60 capsule, Rfl: 5    phenazopyridine (PYRIDIUM) 200 mg tablet, Take 1 tablet (200 mg total) by mouth 2 (two) times a day as needed for bladder spasms, Disp: 30 tablet, Rfl: 5    polyethylene glycol (MIRALAX) 17 g packet, Take 17 g by mouth daily as needed (constipation), Disp: , Rfl:     promethazine (PHENERGAN) 25 mg tablet, Take 1 tablet (25 mg total) by mouth every 6 (six) hours as needed for nausea or vomiting, Disp: 60 tablet, Rfl: 5    senna (SENOKOT) 8.6 mg, Take 1 tablet (8.6  mg total) by mouth daily as needed for constipation, Disp: , Rfl:     spironolactone (ALDACTONE) 25 mg tablet, TAKE ONE TABLET BY MOUTH TWICE DAILY, Disp: 180 tablet, Rfl: 1    traMADol (ULTRAM) 50 mg tablet, Take 2 tablets (100 mg total) by mouth every 8 (eight) hours as needed for moderate pain, Disp: 180 tablet, Rfl: 0    triamcinolone (KENALOG) 0.1 % cream, Apply topically 2 (two) times a day, Disp: 15 g, Rfl: 2    Current Facility-Administered Medications:     cyanocobalamin injection 1,000 mcg, 1,000 mcg, Intramuscular, Q30 Days, Coy Ayoub DO, 1,000 mcg at 11/16/23 1151        Alexis Hobson MD

## 2024-07-31 NOTE — OCCUPATIONAL THERAPY INITIAL EVALUATION ADULT - LEVEL OF INDEPENDENCE: SIT/STAND, REHAB EVAL
Pt due for appt for refills, appt scheduled 8/1/24  Left message to call the office for short term if needed   independent

## 2024-10-29 NOTE — ED ADULT NURSE NOTE - NS TRANSFER PATIENT BELONGINGS
- Review of chart and consultation notes  - Exam and discussion with patient  - Review of laboratory and imaging   - Establishing a care plan for patient  - Communication with other providers Clothing

## 2024-12-05 NOTE — ED PROVIDER NOTE - PSYCHIATRIC NEGATIVE STATEMENT, MLM
RECEIVING UNIT ED HANDOFF REVIEW    ED Nurse Handoff Report was reviewed by: Enedina Vaughan, RN on December 5, 2024 at 2:29 PM       no known mental health issues.

## 2025-02-27 NOTE — H&P PST ADULT - NS MD [employer] INPL[employer]S MED DEV
L/M and my chart message to schedule LW classes. If patient calls back, please offer...    March 6, 13, 20, 27  6-8 pm  Virtual    Please sent back to Sandy to add to schedule.   None

## 2025-03-27 NOTE — ED ADULT NURSE NOTE - PMH
DJO revision femur 6/6/10 vvc+/ 32    TTT: 58 min
Abdominal aortic aneurysm    Atrial fibrillation    Diabetes    High cholesterol    Hypertension    Overactive bladder